# Patient Record
Sex: MALE | Race: WHITE | NOT HISPANIC OR LATINO | Employment: FULL TIME | URBAN - METROPOLITAN AREA
[De-identification: names, ages, dates, MRNs, and addresses within clinical notes are randomized per-mention and may not be internally consistent; named-entity substitution may affect disease eponyms.]

---

## 2017-03-27 ENCOUNTER — DOCUMENTATION ONLY (OUTPATIENT)
Dept: LAB | Facility: CLINIC | Age: 47
End: 2017-03-27

## 2017-03-27 DIAGNOSIS — Z00.00 ROUTINE GENERAL MEDICAL EXAMINATION AT A HEALTH CARE FACILITY: ICD-10-CM

## 2017-03-27 DIAGNOSIS — Z13.6 CARDIOVASCULAR SCREENING; LDL GOAL LESS THAN 160: Primary | ICD-10-CM

## 2017-03-27 DIAGNOSIS — Z12.5 SPECIAL SCREENING FOR MALIGNANT NEOPLASM OF PROSTATE: ICD-10-CM

## 2017-03-27 NOTE — PROGRESS NOTES
This patient has a lab only appointment on 3/28/2017 but does not have future orders. Please review, associate diagnosis and sign pending lab orders for the upcoming appointment.  He has an appointment with Dr. Steve on 3/28/2017 after the lab appointment.    Thank you,    [facility]

## 2017-03-28 ENCOUNTER — OFFICE VISIT (OUTPATIENT)
Dept: FAMILY MEDICINE | Facility: CLINIC | Age: 47
End: 2017-03-28
Payer: COMMERCIAL

## 2017-03-28 VITALS
OXYGEN SATURATION: 98 % | TEMPERATURE: 98.4 F | HEIGHT: 67 IN | SYSTOLIC BLOOD PRESSURE: 132 MMHG | WEIGHT: 166 LBS | BODY MASS INDEX: 26.06 KG/M2 | DIASTOLIC BLOOD PRESSURE: 83 MMHG | HEART RATE: 78 BPM

## 2017-03-28 DIAGNOSIS — Z13.6 CARDIOVASCULAR SCREENING; LDL GOAL LESS THAN 160: ICD-10-CM

## 2017-03-28 DIAGNOSIS — E78.1 HIGH TRIGLYCERIDES: ICD-10-CM

## 2017-03-28 DIAGNOSIS — Z00.00 ROUTINE GENERAL MEDICAL EXAMINATION AT A HEALTH CARE FACILITY: Primary | ICD-10-CM

## 2017-03-28 DIAGNOSIS — F41.0 PANIC ATTACKS: ICD-10-CM

## 2017-03-28 DIAGNOSIS — Z00.00 ROUTINE GENERAL MEDICAL EXAMINATION AT A HEALTH CARE FACILITY: ICD-10-CM

## 2017-03-28 DIAGNOSIS — Z12.5 SPECIAL SCREENING FOR MALIGNANT NEOPLASM OF PROSTATE: ICD-10-CM

## 2017-03-28 DIAGNOSIS — N28.1 RENAL CYST, RIGHT: ICD-10-CM

## 2017-03-28 LAB
ALBUMIN SERPL-MCNC: 4.1 G/DL (ref 3.4–5)
ALP SERPL-CCNC: 64 U/L (ref 40–150)
ALT SERPL W P-5'-P-CCNC: 42 U/L (ref 0–70)
ANION GAP SERPL CALCULATED.3IONS-SCNC: 9 MMOL/L (ref 3–14)
AST SERPL W P-5'-P-CCNC: 21 U/L (ref 0–45)
BILIRUB SERPL-MCNC: 0.7 MG/DL (ref 0.2–1.3)
BUN SERPL-MCNC: 15 MG/DL (ref 7–30)
CALCIUM SERPL-MCNC: 9.1 MG/DL (ref 8.5–10.1)
CHLORIDE SERPL-SCNC: 101 MMOL/L (ref 94–109)
CHOLEST SERPL-MCNC: 224 MG/DL
CO2 SERPL-SCNC: 29 MMOL/L (ref 20–32)
CREAT SERPL-MCNC: 1.1 MG/DL (ref 0.66–1.25)
ERYTHROCYTE [DISTWIDTH] IN BLOOD BY AUTOMATED COUNT: 12.7 % (ref 10–15)
GFR SERPL CREATININE-BSD FRML MDRD: 72 ML/MIN/1.7M2
GLUCOSE SERPL-MCNC: 96 MG/DL (ref 70–99)
HCT VFR BLD AUTO: 45.7 % (ref 40–53)
HDLC SERPL-MCNC: 55 MG/DL
HGB BLD-MCNC: 15.9 G/DL (ref 13.3–17.7)
LDLC SERPL CALC-MCNC: 98 MG/DL
MCH RBC QN AUTO: 30.5 PG (ref 26.5–33)
MCHC RBC AUTO-ENTMCNC: 34.8 G/DL (ref 31.5–36.5)
MCV RBC AUTO: 88 FL (ref 78–100)
NONHDLC SERPL-MCNC: 169 MG/DL
PLATELET # BLD AUTO: 209 10E9/L (ref 150–450)
POTASSIUM SERPL-SCNC: 4.2 MMOL/L (ref 3.4–5.3)
PROT SERPL-MCNC: 7.5 G/DL (ref 6.8–8.8)
PSA SERPL-ACNC: 0.82 UG/L (ref 0–4)
RBC # BLD AUTO: 5.22 10E12/L (ref 4.4–5.9)
SODIUM SERPL-SCNC: 139 MMOL/L (ref 133–144)
TRIGL SERPL-MCNC: 353 MG/DL
WBC # BLD AUTO: 7.1 10E9/L (ref 4–11)

## 2017-03-28 PROCEDURE — 36415 COLL VENOUS BLD VENIPUNCTURE: CPT | Performed by: FAMILY MEDICINE

## 2017-03-28 PROCEDURE — 80053 COMPREHEN METABOLIC PANEL: CPT | Performed by: FAMILY MEDICINE

## 2017-03-28 PROCEDURE — 99396 PREV VISIT EST AGE 40-64: CPT | Performed by: FAMILY MEDICINE

## 2017-03-28 PROCEDURE — 85027 COMPLETE CBC AUTOMATED: CPT | Performed by: FAMILY MEDICINE

## 2017-03-28 PROCEDURE — 80061 LIPID PANEL: CPT | Performed by: FAMILY MEDICINE

## 2017-03-28 PROCEDURE — G0103 PSA SCREENING: HCPCS | Performed by: FAMILY MEDICINE

## 2017-03-28 ASSESSMENT — ANXIETY QUESTIONNAIRES
6. BECOMING EASILY ANNOYED OR IRRITABLE: SEVERAL DAYS
3. WORRYING TOO MUCH ABOUT DIFFERENT THINGS: NOT AT ALL
7. FEELING AFRAID AS IF SOMETHING AWFUL MIGHT HAPPEN: NOT AT ALL
GAD7 TOTAL SCORE: 1
1. FEELING NERVOUS, ANXIOUS, OR ON EDGE: NOT AT ALL
5. BEING SO RESTLESS THAT IT IS HARD TO SIT STILL: NOT AT ALL
2. NOT BEING ABLE TO STOP OR CONTROL WORRYING: NOT AT ALL

## 2017-03-28 ASSESSMENT — PATIENT HEALTH QUESTIONNAIRE - PHQ9: 5. POOR APPETITE OR OVEREATING: NOT AT ALL

## 2017-03-28 NOTE — PROGRESS NOTES
SUBJECTIVE:     CC: Yunior Goss is an 46 year old male who presents for preventative health visit.     Dad with MI at 51yo. X-smoker dad and htn.  Mom with CVA - 64yo. Quit smoking 3 months.  Exercise - needs more.   No vitaminD but drinks 3 cups/day.   Healthy Habits:    Do you get at least three servings of calcium containing foods daily (dairy, green leafy vegetables, etc.)? yes    Amount of exercise or daily activities, outside of work: 0 day(s) per week    Problems taking medications regularly No    Medication side effects: No    Have you had an eye exam in the past two years? yes    Do you see a dentist twice per year? no    Do you have sleep apnea, excessive snoring or / he wants to talk about sleep apnea         PROBLEMS TO ADD ON...    Today's PHQ-2 Score:   PHQ-2 ( 1999 [employer]) 3/27/2017 6/5/2015   Q1: Little interest or pleasure in doing things - 0   Q2: Feeling down, depressed or hopeless - 0   PHQ-2 Score - 0   Little interest or pleasure in doing things Not at all -   Feeling down, depressed or hopeless Not at all -   PHQ-2 Score 0 -       Abuse: Current or Past(Physical, Sexual or Emotional)- No  Do you feel safe in your environment - Yes    Social History   Substance Use Topics     Smoking status: Current Every Day Smoker     Packs/day: 0.50     Years: 8.00     Types: Cigarettes     Smokeless tobacco: Never Used      Comment: 2013 1/2 ppd      Alcohol use Yes      Comment: daily 1-2 beers per day     beer - 2-3 a night     Last PSA: No results found for: PSA    Recent Labs   Lab Test  05/28/13   1338   CHOL  222*   HDL  44   LDL  126   TRIG  260*   CHOLHDLRATIO  5.1*       Reviewed orders with patient. Reviewed health maintenance and updated orders accordingly - Yes    Reviewed and updated as needed this visit by clinical staff         Reviewed and updated as needed this visit by Provider            ROS:  C: NEGATIVE for fever, chills, change in weight  I: NEGATIVE for worrisome rashes,  "moles or lesions. No changes. No major burning in sun  E: NEGATIVE for vision changes or irritation, ok exam recently.   ENT: NEGATIVE for ear, mouth and throat problems. Some snoring - no issues with darrell noted.   R: NEGATIVE for significant cough or SOB  CV: NEGATIVE for chest pain, palpitations or peripheral edema, good exercise tolerance  GI: NEGATIVE for nausea, abdominal pain, heartburn - on/off in past - egd in past, or change in bowel habits. No black or bloody stools.   male: negative for dysuria, hematuria, decreased urinary stream, erectile dysfunction, urethral discharge. No std concerns.   M: NEGATIVE for significant arthralgias or myalgia  N: NEGATIVE for weakness, dizziness or paresthesias  E: NEGATIVE for temperature intolerance, skin/hair changes  H: NEGATIVE for bleeding problems  P: NEGATIVE for changes in mood or affect. Rare panic attacks in past. Can happen sleeping or rarely driving. No interested in therapist or medications.     Problem list, Medication list, Allergies, and Medical/Social/Surgical histories reviewed in [facility] and updated as appropriate.  OBJECTIVE:     There were no vitals taken for this visit.   /83  Pulse 78  Temp 98.4  F (36.9  C) (Oral)  Ht 5' 6.5\" (1.689 m)  Wt 166 lb (75.3 kg)  SpO2 98%  BMI 26.39 kg/m2    EXAM:  GENERAL: healthy, alert and no distress  EYES: Eyes grossly normal to inspection, PERRL and conjunctivae and sclerae normal  HENT: ear canals and TM's normal, nose and mouth without ulcers or lesions  NECK: no adenopathy, no asymmetry, masses, or scars and thyroid normal to palpation  RESP: lungs clear to auscultation - no rales, rhonchi or wheezes  BREAST: normal without masses, tenderness or nipple discharge and no palpable axillary masses or adenopathy  CV: regular rate and rhythm, normal S1 S2, no S3 or S4, no murmur, click or rub, no peripheral edema and peripheral pulses strong  ABDOMEN: soft, nontender, no hepatosplenomegaly, no masses and " bowel sounds normal   (male): normal male genitalia without lesions or urethral discharge, no hernia  RECTAL (male): normal sphincter tone, no rectal masses, prostate normal size, smooth, nontender without nodules or masses  RECTAL (male): small ext hemorrhoids  MS: no gross musculoskeletal defects noted, no edema  SKIN: no suspicious lesions or rashes  NEURO: Normal strength and tone, mentation intact and speech normal  BACK: no CVA tenderness, no paralumbar tenderness  PSYCH: mentation appears normal, affect normal/bright  PSYCH: mildly anxious  LYMPH: no cervical, supraclavicular, axillary, or inguinal adenopathy    ASSESSMENT/PLAN:     ASSESSMENT / PLAN:  (Z00.00) Routine general medical examination at a health care facility  (primary encounter diagnosis)  Comment: generally healthy and normal labs/exam  Plan: Reviewed self mole/testicle check handout.  Call/email with questions/concerns. Hemorrhoid ext -mor water/ less ALCOHOL/caffeine and stooling time. Follow-up surgery if worse/not improving in next month. Expected course and warning signs reviewed. Call/email with questions/concerns    (E78.1) High triglycerides  Plan: exercise and limit ALCOHOL. Fish oil. Recheck one year. Consider statin if a lot worse. Monitor blood pressure too.     (F41.0) Panic attacks  Comment: infrequnet  Plan: consider beta-blocker or therapist. If SUICIAL IDEATION OR HOMOCIDAL IDEATION OR ANN TO ER. Limit caffeine/ ALCOHOL. Encouraged but smoking cessation.     (N28.1) Renal cyst, right  Comment: no symptoms. Patient was told to get one more follow-up u/s.   Plan: US Renal Limited        Back to urology if symptoms or a lot worse. Expected course and warning signs reviewed.         Patient interested in vasectomy - # given  to discuss    COUNSELING:  Reviewed preventive health counseling, as reflected in patient instructions       Regular exercise       Healthy diet/nutrition       Vision screening       Family  "planning       Prostate cancer screening       Osteoporosis Prevention/Bone Health         reports that he has been smoking Cigarettes.  He has a 4.00 pack-year smoking history. He has never used smokeless tobacco.    Estimated body mass index is 26 kg/(m^2) as calculated from the following:    Height as of 8/11/15: 5' 6\" (1.676 m).    Weight as of 9/2/15: 161 lb 1.6 oz (73.1 kg).       Counseling Resources:  ATP IV Guidelines  Pooled Cohorts Equation Calculator  FRAX Risk Assessment  ICSI Preventive Guidelines  Dietary Guidelines for Americans, 2010  Vatler's MyPlate  ASA Prophylaxis  Lung CA Screening    Alan Steve MD  [facility] [place]  Answers for HPI/ROS submitted by the patient on 3/27/2017   Annual Exam:  Getting at least 3 servings of Calcium per day:: Yes  Bi-annual eye exam:: Yes  Dental care twice a year:: NO  Sleep apnea or symptoms of sleep apnea:: None  Diet:: Regular (no restrictions)  Frequency of exercise:: None  Taking medications regularly:: Yes  Medication side effects:: Not applicable  Additional concerns today:: YES  Q1: Little interest or pleasure in doing things: 0=Not at all  Q2: Feeling down, depressed or hopeless: 0=Not at all  PHQ-2 Score: 0    "

## 2017-03-28 NOTE — MR AVS SNAPSHOT
After Visit Summary   3/28/2017    Yunior Goss    MRN: [id number]           Patient Information     Date Of Birth          1970        Visit Information        Provider Department      3/28/2017 3:10 PM Alan Steve MD [facility]        Today's Diagnoses     Routine general medical examination at a health care facility    -  1    High triglycerides        Panic attacks        Renal cyst, right          Care Instructions      Preventive Health Recommendations  Male Ages 40 to 49    Yearly exam:             See your health care provider every year in order to  o   Review health changes.   o   Discuss preventive care.    o   Review your medicines if your doctor has prescribed any.    You should be tested each year for STDs (sexually transmitted diseases) if you re at risk.     Have a cholesterol test every 5 years.     Have a colonoscopy (test for colon cancer) if someone in your family has had colon cancer or polyps before age 50.     After age 45, have a diabetes test (fasting glucose). If you are at risk for diabetes, you should have this test every 3 years.      Talk with your health care provider about whether or not a prostate cancer screening test (PSA) is right for you.    Shots: Get a flu shot each year. Get a tetanus shot every 10 years.     Nutrition:    Eat at least 5 servings of fruits and vegetables daily.     Eat whole-grain bread, whole-wheat pasta and brown rice instead of white grains and rice.     Talk to your provider about Calcium and Vitamin D.     Lifestyle    Exercise for at least 150 minutes a week (30 minutes a day, 5 days a week). This will help you control your weight and prevent disease.     Limit alcohol to one drink per day.     No smoking.     Wear sunscreen to prevent skin cancer.     See your dentist every six months for an exam and cleaning.            Follow-ups after your visit        Future tests that were ordered for you today     Open  "Future Orders        Priority Expected Expires Ordered    US Renal Limited Routine 6/26/2017 3/28/2018 3/28/2017            Who to contact     If you have questions or need follow up information about today's clinic visit or your schedule please contact [facility] directly at [phone].  Normal or non-critical lab and imaging results will be communicated to you by MyChart, letter or phone within 4 business days after the clinic has received the results. If you do not hear from us within 7 days, please contact the clinic through AeroDynEnergyhart or phone. If you have a critical or abnormal lab result, we will notify you by phone as soon as possible.  Submit refill requests through AppTap or call your pharmacy and they will forward the refill request to us. Please allow 3 business days for your refill to be completed.          Additional Information About Your Visit        MyChart Information     AppTap gives you secure access to your electronic health record. If you see a primary care provider, you can also send messages to your care team and make appointments. If you have questions, please call your primary care clinic.  If you do not have a primary care provider, please call [phone] and they will assist you.        Care EveryWhere ID     This is your Care EveryWhere ID. This could be used by other organizations to access your [place] medical records  [id number]        Your Vitals Were     Pulse Temperature Height Pulse Oximetry BMI (Body Mass Index)       78 98.4  F (36.9  C) (Oral) 5' 6.5\" (1.689 m) 98% 26.39 kg/m2        Blood Pressure from Last 3 Encounters:   03/28/17 132/83   09/02/15 134/86   08/11/15 129/90    Weight from Last 3 Encounters:   03/28/17 166 lb (75.3 kg)   09/02/15 161 lb 1.6 oz (73.1 kg)   08/11/15 163 lb 3.2 oz (74 kg)               Primary Care Provider Office Phone # Fax #    Alan Steve [phone]       [facility] [address] [address] " [facility] [address]        Thank you!     Thank you for choosing [facility]  for your care. Our goal is always to provide you with excellent care. Hearing back from our patients is one way we can continue to improve our services. Please take a few minutes to complete the written survey that you may receive in the mail after your visit with us. Thank you!             Your Updated Medication List - Protect others around you: Learn how to safely use, store and throw away your medicines at www.disposemymeds.org.          This list is accurate as of: 3/28/17  4:03 PM.  Always use your most recent med list.                   Brand Name Dispense Instructions for use    omeprazole 40 MG capsule    priLOSEC    30 capsule    Take 1 capsule (40 mg) by mouth daily Take 30-60 minutes before a meal.       TUMS 500 MG chewable tablet   Generic drug:  calcium carbonate      Take 1 chew tab by mouth daily.

## 2017-03-28 NOTE — NURSING NOTE
"Chief Complaint   Patient presents with     Physical       Initial /83  Pulse 78  Temp 98.4  F (36.9  C) (Oral)  Ht 5' 6.5\" (1.689 m)  Wt 166 lb (75.3 kg)  SpO2 98%  BMI 26.39 kg/m2 Estimated body mass index is 26.39 kg/(m^2) as calculated from the following:    Height as of this encounter: 5' 6.5\" (1.689 m).    Weight as of this encounter: 166 lb (75.3 kg).  Medication Reconciliation: complete   Paola Ocasio CMA    "

## 2017-03-29 ASSESSMENT — ANXIETY QUESTIONNAIRES: GAD7 TOTAL SCORE: 1

## 2017-03-29 ASSESSMENT — PATIENT HEALTH QUESTIONNAIRE - PHQ9: SUM OF ALL RESPONSES TO PHQ QUESTIONS 1-9: 0

## 2017-11-26 ENCOUNTER — TRANSFERRED RECORDS (OUTPATIENT)
Dept: HEALTH INFORMATION MANAGEMENT | Facility: CLINIC | Age: 47
End: 2017-11-26

## 2017-11-28 NOTE — PROGRESS NOTES
SUBJECTIVE:                                                    Yunior Goss is a 47 year old male who presents to clinic today for the following health issues:      ED/UC Followup:    Facility:  [facility]  Date of visit: 11/26/2017  Reason for visit: Per pt passed out and fell  Current Status: SX per pt feeling okay today       Patient was seen in the ER after passing out in his home and the sustaining a laceration to his upper lip. This was repaired with both internal sutures and 3 external sutures that need to be removed today. He feels the wound is healing well. No fevers or drainage from the area. It is only mildly tender. No bleeding.    He has a history of vasovagal syncope which is described very well in his ER history of present illness.  Before this incident patient had not eaten dinner and had 5 beers and admits to not drinking much water that day. His blood pressure was initially low in the ER but returned to normal. He has no history or evidence of seizures. He does have a history of infrequent panic attacks that only occur when he is driving, patient declines the need for help with his anxiety symptoms at this time. No depression. Denies suicidal or homicidal thoughts.  Patient instructed to go to the emergency room or call 911 if these occur.  He states he has about 2-3 beers per night, no other drug or alcohol use. CAGE negative. We discussed that this is above the recommended amount for men, he feels that it will not be difficult to decrease the amount to 10 or less per week. He states he normally does not skip meals. He had a normal EKG and lab workup in the ER. He was sent home with a diagnosis of vasovagal syncopal. It was again encouraged that he stay well hydrated throughout the day and decrease or eliminate his alcohol use. He reports no dizziness, headache, vision changes, or anxiety today.    Vitals today are stable.  Daily smoker.       Problem list and histories reviewed &  adjusted, as indicated.  Additional history: as documented    Patient Active Problem List   Diagnosis     CARDIOVASCULAR SCREENING; LDL GOAL LESS THAN 160     Overweight (BMI 25.0-29.9)     Tobacco abuse     Past Surgical History:   Procedure Laterality Date     BIOPSY  2000    Mole removed from back of leg.  Determined to be benign.     ESOPHAGOSCOPY, GASTROSCOPY, DUODENOSCOPY (EGD), COMBINED N/A 7/3/2015    Procedure: COMBINED ESOPHAGOSCOPY, GASTROSCOPY, DUODENOSCOPY (EGD);  Surgeon: Duane, William Charles, MD;  Location:  OR       Social History   Substance Use Topics     Smoking status: Current Every Day Smoker     Packs/day: 0.50     Years: 8.00     Types: Other     Start date: 8/1/2008     Last attempt to quit: 1/3/2017     Smokeless tobacco: Never Used      Comment: Quit tobacco cigarettes 1/3/2017. Continue to smoke e-cigarettes.     Alcohol use Yes      Comment: daily 1-2 beers per day     Family History   Problem Relation Age of Onset     DIABETES Mother 65     CEREBROVASCULAR DISEASE Mother 65     Myocardial Infarction Father 50     heart attack     Myocardial Infarction Maternal Grandfather 50         Current Outpatient Prescriptions   Medication Sig Dispense Refill     amoxicillin-clavulanate (AUGMENTIN) 875-125 MG per tablet Take 1 tablet by mouth 2 times daily       calcium carbonate (TUMS) 500 MG chewable tablet Take 1 chew tab by mouth daily.       omeprazole (PRILOSEC) 40 MG capsule Take 1 capsule (40 mg) by mouth daily Take 30-60 minutes before a meal. (Patient not taking: Reported on 11/30/2017) 30 capsule 0     No Known Allergies      ROS:  Constitutional, HEENT, cardiovascular, pulmonary, GI, , musculoskeletal, neuro, skin, endocrine and psych systems are negative, except as otherwise noted.      OBJECTIVE:   /86  Pulse 71  Temp 98.9  F (37.2  C) (Oral)  Wt 180 lb (81.6 kg)  SpO2 100%  BMI 28.62 kg/m2  Body mass index is 28.62 kg/(m^2).  GENERAL: healthy, alert and no  distress  RESP: lungs clear to auscultation - no rales, rhonchi or wheezes  CV: regular rate and rhythm, normal S1 S2, no S3 or S4, no murmur, click or rub, no peripheral edema and peripheral pulses strong  MS: no gross musculoskeletal defects noted, no edema  NEURO: Normal strength and tone, mentation intact and speech normal  PSYCH: mentation appears normal, affect normal/bright  Skin: wound well approximated and closed. No signs of infection/edema or erythema or drainge.     Procedure: Sterile suture kit was used to remove 3 sutures in patient's upper lip region. Patient tolerated well with minimal bleeding.    ASSESSMENT/PLAN:     ASSESSMENT / PLAN:  (R55) Syncope, unspecified syncope type  (primary encounter diagnosis)  Comment: Agree with the ER physician that his history is consistent with vasovagal syncope. However patient possibly would like to get an opinion from a neurologist and therefore referral was placed. He will follow-up if the symptoms occur again or change.  Plan: NEUROLOGY ADULT REFERRAL            (Z48.02) Visit for suture removal  Comment:   Plan: Keep area clean, follow-up only if needed    Chart documentation performed partially with Dragon Voice recognition Software. Although reviewed after completion, some word and grammatical error may remain.    Billin min spent face-to-face with patient. 15 min on history, 5 on exam and removing sutures, 5 on discussing diagnosis and treatment plan.           Amanda Hernandez PA-C  [facility]

## 2017-11-30 ENCOUNTER — OFFICE VISIT (OUTPATIENT)
Dept: FAMILY MEDICINE | Facility: CLINIC | Age: 47
End: 2017-11-30
Payer: COMMERCIAL

## 2017-11-30 ENCOUNTER — TELEPHONE (OUTPATIENT)
Dept: FAMILY MEDICINE | Facility: CLINIC | Age: 47
End: 2017-11-30

## 2017-11-30 VITALS
SYSTOLIC BLOOD PRESSURE: 137 MMHG | WEIGHT: 180 LBS | HEART RATE: 71 BPM | BODY MASS INDEX: 28.62 KG/M2 | TEMPERATURE: 98.9 F | OXYGEN SATURATION: 100 % | DIASTOLIC BLOOD PRESSURE: 86 MMHG

## 2017-11-30 DIAGNOSIS — R55 SYNCOPE, UNSPECIFIED SYNCOPE TYPE: Primary | ICD-10-CM

## 2017-11-30 DIAGNOSIS — Z48.02 VISIT FOR SUTURE REMOVAL: ICD-10-CM

## 2017-11-30 PROCEDURE — 99214 OFFICE O/P EST MOD 30 MIN: CPT | Performed by: PHYSICIAN ASSISTANT

## 2017-11-30 NOTE — MR AVS SNAPSHOT
After Visit Summary   11/30/2017    Yunior Goss    MRN: [id number]           Patient Information     Date Of Birth          1970        Visit Information        Provider Department      11/30/2017 3:00 PM Amanda Hernandez PA-C [facility]        Today's Diagnoses     Syncope, unspecified syncope type    -  1    Visit for suture removal           Follow-ups after your visit        Additional Services     NEUROLOGY ADULT REFERRAL       Your provider has referred you for the following:   Consult at [facility]: [facility] - [place]o Fallon [phone]   [email or web address]    Please be aware that coverage of these services is subject to the terms and limitations of your health insurance plan.  Call member services at your health plan with any benefit or coverage questions.      Please bring the following with you to your appointment:    (1) Any X-Rays, CTs or MRIs which have been performed.  Contact the facility where they were done to arrange for  prior to your scheduled appointment.    (2) List of current medications  (3) This referral request   (4) Any documents/labs given to you for this referral                  Who to contact     If you have questions or need follow up information about today's clinic visit or your schedule please contact [facility] directly at [phone].  Normal or non-critical lab and imaging results will be communicated to you by MyChart, letter or phone within 4 business days after the clinic has received the results. If you do not hear from us within 7 days, please contact the clinic through MyChart or phone. If you have a critical or abnormal lab result, we will notify you by phone as soon as possible.  Submit refill requests through VivaBioCell or call your pharmacy and they will forward the refill request to us. Please allow 3 business days for your refill to be completed.           Additional Information About Your Visit        MyChart Information     [employer] gives you secure access to your electronic health record. If you see a primary care provider, you can also send messages to your care team and make appointments. If you have questions, please call your primary care clinic.  If you do not have a primary care provider, please call [phone] and they will assist you.        Care EveryWhere ID     This is your Care EveryWhere ID. This could be used by other organizations to access your [place] medical records  [id number]        Your Vitals Were     Pulse Temperature Pulse Oximetry BMI (Body Mass Index)          71 98.9  F (37.2  C) (Oral) 100% 28.62 kg/m2         Blood Pressure from Last 3 Encounters:   11/30/17 137/86   03/28/17 132/83   09/02/15 134/86    Weight from Last 3 Encounters:   11/30/17 180 lb (81.6 kg)   03/28/17 166 lb (75.3 kg)   09/02/15 161 lb 1.6 oz (73.1 kg)              We Performed the Following     NEUROLOGY ADULT REFERRAL        Primary Care Provider Office Phone # Fax #    Alan Jeanmarie Steve [phone] [address] [facility] [address]        Equal Access to Services     ROSSY OLMEDO : Hadii dre ku hadasho Soomaali, waaxda luqadaha, qaybta kaalmada adeegyada, shay agudelo. So [facility] [phone].    ATENCIÓN: Si habla español, tiene a minor disposición servicios gratuitos de asistencia lingüística. Llame al [phone].    We comply with applicable federal civil rights laws and [place] laws. We do not discriminate on the basis of race, color, national origin, age, disability, sex, sexual orientation, or gender identity.            Thank you!     Thank you for choosing [facility]  for your care. Our goal is always to provide you with excellent care. Hearing back from our patients is one way we can continue to improve our services. Please take a few minutes to complete the written survey that you  may receive in the mail after your visit with us. Thank you!             Your Updated Medication List - Protect others around you: Learn how to safely use, store and throw away your medicines at www.disposemymeds.org.          This list is accurate as of: 11/30/17  3:36 PM.  Always use your most recent med list.                   Brand Name Dispense Instructions for use Diagnosis    amoxicillin-clavulanate 875-125 MG per tablet    AUGMENTIN     Take 1 tablet by mouth 2 times daily        omeprazole 40 MG capsule    priLOSEC    30 capsule    Take 1 capsule (40 mg) by mouth daily Take 30-60 minutes before a meal.    Abdominal pain, epigastric, Thoracic back pain, unspecified back pain laterality       TUMS 500 MG chewable tablet   Generic drug:  calcium carbonate      Take 1 chew tab by mouth daily.

## 2017-11-30 NOTE — TELEPHONE ENCOUNTER
Per My David's request. I called and left a message on the patient's voicemail to see what ER he was seen at so we can get records for Amanda to review before appointment.Leila Matias MA/EMANI

## 2017-11-30 NOTE — TELEPHONE ENCOUNTER
Patient called back, he was seen at [facility]. Do you want to see if you can pull anything for  Care everywhere? I can call [facility], but they may need an SUJIT.Leila Matias MA/EMANI

## 2017-11-30 NOTE — NURSING NOTE
"Chief Complaint   Patient presents with     Hospital F/U     [facility] ER F/U, pt passed out and has stitches on the lip that needs to be removed.        Initial /86  Pulse 71  Temp 98.9  F (37.2  C) (Oral)  Wt 180 lb (81.6 kg)  SpO2 100%  BMI 28.62 kg/m2 Estimated body mass index is 28.62 kg/(m^2) as calculated from the following:    Height as of 3/28/17: 5' 6.5\" (1.689 m).    Weight as of this encounter: 180 lb (81.6 kg).  Medication Reconciliation: complete      Surinder Cadena MA    "

## 2018-04-09 ENCOUNTER — OFFICE VISIT (OUTPATIENT)
Dept: FAMILY MEDICINE | Facility: CLINIC | Age: 48
End: 2018-04-09
Payer: COMMERCIAL

## 2018-04-09 VITALS
BODY MASS INDEX: 26.84 KG/M2 | HEIGHT: 67 IN | TEMPERATURE: 98.1 F | SYSTOLIC BLOOD PRESSURE: 121 MMHG | HEART RATE: 74 BPM | OXYGEN SATURATION: 99 % | RESPIRATION RATE: 18 BRPM | WEIGHT: 171 LBS | DIASTOLIC BLOOD PRESSURE: 80 MMHG

## 2018-04-09 DIAGNOSIS — J98.9 RESPIRATORY ILLNESS WITH FEVER: Primary | ICD-10-CM

## 2018-04-09 DIAGNOSIS — R50.9 RESPIRATORY ILLNESS WITH FEVER: Primary | ICD-10-CM

## 2018-04-09 DIAGNOSIS — J10.1 INFLUENZA B: ICD-10-CM

## 2018-04-09 DIAGNOSIS — R07.0 THROAT PAIN: ICD-10-CM

## 2018-04-09 LAB
DEPRECATED S PYO AG THROAT QL EIA: NORMAL
FLUAV+FLUBV AG SPEC QL: NEGATIVE
FLUAV+FLUBV AG SPEC QL: POSITIVE
SPECIMEN SOURCE: ABNORMAL
SPECIMEN SOURCE: NORMAL

## 2018-04-09 PROCEDURE — 87081 CULTURE SCREEN ONLY: CPT | Performed by: PHYSICIAN ASSISTANT

## 2018-04-09 PROCEDURE — 87804 INFLUENZA ASSAY W/OPTIC: CPT | Performed by: PHYSICIAN ASSISTANT

## 2018-04-09 PROCEDURE — 87880 STREP A ASSAY W/OPTIC: CPT | Performed by: PHYSICIAN ASSISTANT

## 2018-04-09 PROCEDURE — 99213 OFFICE O/P EST LOW 20 MIN: CPT | Performed by: PHYSICIAN ASSISTANT

## 2018-04-09 NOTE — MR AVS SNAPSHOT
"              After Visit Summary   4/9/2018    Yunior Goss    MRN: [id number]           Patient Information     Date Of Birth          1970        Visit Information        Provider Department      4/9/2018 4:20 PM Trae Lemon PA-C [facility]        Today's Diagnoses     Respiratory illness with fever    -  1    Throat pain        Fever        Influenza B           Follow-ups after your visit        Who to contact     Normal or non-critical lab and imaging results will be communicated to you by [employer], letter or phone within 4 business days after the clinic has received the results. If you do not hear from us within 7 days, please contact the clinic through Telemedicine [facility] or phone. If you have a critical or abnormal lab result, we will notify you by phone as soon as possible.  Submit refill requests through [employer] or call your pharmacy and they will forward the refill request to us. Please allow 3 business days for your refill to be completed.          If you need to speak with a  for additional information , please call: [phone]             Additional Information About Your Visit        [employer] Information     [employer] gives you secure access to your electronic health record. If you see a primary care provider, you can also send messages to your care team and make appointments. If you have questions, please call your primary care clinic.  If you do not have a primary care provider, please call [phone] and they will assist you.        Care EveryWhere ID     This is your Care EveryWhere ID. This could be used by other organizations to access your [place] medical records  [id number]        Your Vitals Were     Pulse Temperature Respirations Height Pulse Oximetry BMI (Body Mass Index)    74 98.1  F (36.7  C) (Tympanic) 18 5' 6.5\" (1.689 m) 99% 27.19 kg/m2       Blood Pressure from Last 3 Encounters:   04/09/18 121/80   11/30/17 137/86   03/28/17 132/83    Weight " from Last 3 Encounters:   04/09/18 171 lb (77.6 kg)   11/30/17 180 lb (81.6 kg)   03/28/17 166 lb (75.3 kg)              We Performed the Following     Beta strep group A culture     Influenza A/B antigen     Strep, Rapid Screen          Today's Medication Changes          These changes are accurate as of 4/9/18  5:22 PM.  If you have any questions, ask your nurse or doctor.               Stop taking these medicines if you haven't already. Please contact your care team if you have questions.     amoxicillin-clavulanate 875-125 MG per tablet   Commonly known as:  AUGMENTIN   Stopped by:  Trae Lemon PA-C           omeprazole 40 MG capsule   Commonly known as:  priLOSEC   Stopped by:  Trae Lemon PA-C                    Primary Care Provider Office Phone # Fax #    Alan Jeanmarie Steve [phone] [id number] [facility] [address]        Equal Access to Services     : Hadii dre ku hadasho Soomaali, waaxda luqadaha, qaybta kaalmada adeegyada, waxay idiin hayaan sienna daniel . So [facility] [phone].    ATENCIÓN: Si habla español, tiene a minor disposición servicios gratuitos de asistencia lingüística. Llame al [phone].    We comply with applicable federal civil rights laws and [place] laws. We do not discriminate on the basis of race, color, national origin, age, disability, sex, sexual orientation, or gender identity.            Thank you!     Thank you for choosing [facility]  for your care. Our goal is always to provide you with excellent care. Hearing back from our patients is one way we can continue to improve our services. Please take a few minutes to complete the written survey that you may receive in the mail after your visit with us. Thank you!             Your Updated Medication List - Protect others around you: Learn how to safely use, store and throw away your medicines at www.disposemymeds.org.          This list is accurate as of  4/9/18  5:22 PM.  Always use your most recent med list.                   Brand Name Dispense Instructions for use Diagnosis    TUMS 500 MG chewable tablet   Generic drug:  calcium carbonate      Take 1 chew tab by mouth daily.

## 2018-04-09 NOTE — PROGRESS NOTES
SUBJECTIVE:   Yunior Goss is a 47 year old male who presents to clinic today for the following health issues:      Acute Illness   Acute illness concerns: congestion and fatigue  Onset: 3 days    Fever: no    Chills/Sweats: no    Headache (location?): no    Sinus Pressure:YES    Conjunctivitis:  no    Ear Pain: no    Rhinorrhea: no    Congestion: YES    Sore Throat: YES     Cough: YES-non-productive    Wheeze: no    Decreased Appetite: no    Nausea: no    Vomiting: no    Diarrhea:  no    Dysuria/Freq.: no    Fatigue/Achiness: YES- fatigue    Sick/Strep Exposure: no     Therapies Tried and outcome: none          Problem list and histories reviewed & adjusted, as indicated.  Additional history: as documented    BP Readings from Last 3 Encounters:   04/09/18 121/80   11/30/17 137/86   03/28/17 132/83    Wt Readings from Last 3 Encounters:   04/09/18 171 lb (77.6 kg)   11/30/17 180 lb (81.6 kg)   03/28/17 166 lb (75.3 kg)                   Reviewed and updated as needed this visit by clinical staff  Tobacco  Allergies  Meds  Med Hx  Surg Hx  Fam Hx  Soc Hx      Reviewed and updated as needed this visit by Provider  Tobacco  Med Hx  Surg Hx  Fam Hx  Soc Hx        All other systems negative except as outline above  OBJECTIVE:  ENT exam reveals - bilaterally TM fluid noted, neck without nodes, pharynx erythematous without exudate, post nasal drip noted, nasal mucosa congested and nasal mucosa pale and congested.  CHEST:chest clear to IPPA, no tachypnea, retractions or cyanosis and S1, S2 normal, no murmur, no gallop, rate regular.    Yunior was seen today for nasal congestion.    Diagnoses and all orders for this visit:    Respiratory illness with fever    Throat pain  -     Strep, Rapid Screen  -     Beta strep group A culture    Influenza B    Other orders  -     Influenza A/B antigen      Advised supportive and symptomatic treatment.  Follow up with Provider - if condition persists or worsens.

## 2018-04-10 LAB
BACTERIA SPEC CULT: NORMAL
SPECIMEN SOURCE: NORMAL

## 2019-06-10 ENCOUNTER — OFFICE VISIT (OUTPATIENT)
Dept: FAMILY MEDICINE | Facility: CLINIC | Age: 49
End: 2019-06-10
Payer: COMMERCIAL

## 2019-06-10 ENCOUNTER — ANCILLARY PROCEDURE (OUTPATIENT)
Dept: GENERAL RADIOLOGY | Facility: CLINIC | Age: 49
End: 2019-06-10
Attending: FAMILY MEDICINE
Payer: COMMERCIAL

## 2019-06-10 VITALS
DIASTOLIC BLOOD PRESSURE: 90 MMHG | TEMPERATURE: 98.5 F | BODY MASS INDEX: 25.9 KG/M2 | HEART RATE: 72 BPM | SYSTOLIC BLOOD PRESSURE: 142 MMHG | WEIGHT: 165 LBS | HEIGHT: 67 IN | OXYGEN SATURATION: 100 % | RESPIRATION RATE: 16 BRPM

## 2019-06-10 DIAGNOSIS — R10.30 INGUINAL PAIN, UNSPECIFIED LATERALITY: ICD-10-CM

## 2019-06-10 DIAGNOSIS — N41.0 ACUTE PROSTATITIS: ICD-10-CM

## 2019-06-10 DIAGNOSIS — R10.30 INGUINAL PAIN, UNSPECIFIED LATERALITY: Primary | ICD-10-CM

## 2019-06-10 PROCEDURE — 99214 OFFICE O/P EST MOD 30 MIN: CPT | Performed by: FAMILY MEDICINE

## 2019-06-10 PROCEDURE — 74019 RADEX ABDOMEN 2 VIEWS: CPT

## 2019-06-10 RX ORDER — CIPROFLOXACIN 500 MG/1
500 TABLET, FILM COATED ORAL 2 TIMES DAILY
Qty: 14 TABLET | Refills: 0 | Status: SHIPPED | OUTPATIENT
Start: 2019-06-10 | End: 2019-06-17

## 2019-06-10 ASSESSMENT — PAIN SCALES - GENERAL: PAINLEVEL: MILD PAIN (2)

## 2019-06-10 ASSESSMENT — MIFFLIN-ST. JEOR: SCORE: 1569.13

## 2019-06-10 NOTE — NURSING NOTE
"Chief Complaint   Patient presents with     Prostate Problem       Initial /90   Pulse 72   Temp 98.5  F (36.9  C) (Oral)   Resp 16   Ht 1.689 m (5' 6.5\")   Wt 74.8 kg (165 lb)   SpO2 100%   BMI 26.23 kg/m   Estimated body mass index is 26.23 kg/m  as calculated from the following:    Height as of this encounter: 1.689 m (5' 6.5\").    Weight as of this encounter: 74.8 kg (165 lb).    Paola Ocasio, SHRUTHI      "

## 2019-06-10 NOTE — PROGRESS NOTES
"SUBJECTIVE:  Yunior Goss, a 48 year old male scheduled an appointment to discuss the following issues:  Concerns about prostate.   Some groin/bladder pressure x1 week. No fevers or chills.  History prostatitis x2 in past but not in awhile (10 years ago - cipro in past). No hematuria. No dysuria.   No rectal pain. Mild diarrhea. No black or bloody stools. No nausea, vomiting.  No std concerns or discharge.   No regular nsaids. No injury. No changes with position. No lumps noted. No family history no family history cancers.   Lots of water. Coffee 2-3 cups/day. ALCOHOL 2-3 beers/night. Office desk.   Medical, social, surgical, and family histories reviewed.    ROS:  All other ROS negative.     OBJECTIVE:  /90   Pulse 72   Temp 98.5  F (36.9  C) (Oral)   Resp 16   Ht 1.689 m (5' 6.5\")   Wt 74.8 kg (165 lb)   SpO2 100%   BMI 26.23 kg/m    EXAM:  GENERAL APPEARANCE: healthy, alert and no distress  EYES: EOMI,  PERRL  HENT: ear canals and TM's normal and nose and mouth without ulcers or lesions  NECK: no adenopathy, no asymmetry, masses, or scars and thyroid normal to palpation  RESP: lungs clear to auscultation - no rales, rhonchi or wheezes  CV: regular rates and rhythm, normal S1 S2, no S3 or S4 and no murmur, click or rub -  ABDOMEN:  soft, nontender, no HSM or masses and bowel sounds normal  Rectal exam: prostate symmetric w/o nodularity, mildly enlarged/mildly tender, no masses palpated  GU_male: testicles normal without atrophy or masses and no hernias  MS: extremities normal- no gross deformities noted, no evidence of inflammation in joints, FROM in all extremities.  SKIN: no suspicious lesions or rashes  NEURO: Normal strength and tone, sensory exam grossly normal, mentation intact and speech normal  PSYCH: mentation appears normal and affect normal/bright, mildly anxious    ASSESSMENT / PLAN:  (R10.30) Inguinal pain, unspecified laterality  (primary encounter diagnosis)  Comment: prostatitis " vs strain vs constipation vs ?  Plan: XR Abdomen 2 Views        See below. Ibuprofen over the counter 600mg 2-3x/day x 5-7 days. Return to clinic if not improving or new symptoms. Expected course and warning signs reviewed.   CPE with previsit labs in next 4-6 weeks. Call/email with questions/concerns.     (N41.0) Acute prostatitis  Comment: history in past similar  Plan: ciprofloxacin (CIPRO) 500 MG tablet        Reveiwed risks and side effects of medication  More water and limit caffeine/ ALCOHOL. To er if fevers/emesis/markedly worsening pain.  psa at cpe at follow-up. Urology if reoccurs.     Alan Steve MD

## 2019-06-17 ENCOUNTER — MYC MEDICAL ADVICE (OUTPATIENT)
Dept: FAMILY MEDICINE | Facility: CLINIC | Age: 49
End: 2019-06-17

## 2019-06-17 DIAGNOSIS — N41.0 ACUTE PROSTATITIS: ICD-10-CM

## 2019-06-17 RX ORDER — CIPROFLOXACIN 500 MG/1
500 TABLET, FILM COATED ORAL 2 TIMES DAILY
Qty: 14 TABLET | Refills: 0 | Status: SHIPPED | OUTPATIENT
Start: 2019-06-17 | End: 2019-07-23

## 2019-06-17 NOTE — TELEPHONE ENCOUNTER
Discussed with Kristen Kehr PA who advises 7 more days of Cipro. Prescription sent to pharmacy.  To provider to cosign.  Paola GARCIAN, RN

## 2019-06-17 NOTE — TELEPHONE ENCOUNTER
Ok for refill x4days more. Alan Steve MD. Will ask urology to see patient for second opinion if persists/reoccurs.

## 2019-07-23 ENCOUNTER — OFFICE VISIT (OUTPATIENT)
Dept: FAMILY MEDICINE | Facility: CLINIC | Age: 49
End: 2019-07-23
Payer: COMMERCIAL

## 2019-07-23 VITALS
DIASTOLIC BLOOD PRESSURE: 80 MMHG | WEIGHT: 165 LBS | SYSTOLIC BLOOD PRESSURE: 120 MMHG | HEIGHT: 67 IN | RESPIRATION RATE: 20 BRPM | BODY MASS INDEX: 25.9 KG/M2 | HEART RATE: 63 BPM | TEMPERATURE: 98.3 F | OXYGEN SATURATION: 100 %

## 2019-07-23 DIAGNOSIS — N41.0 ACUTE PROSTATITIS: ICD-10-CM

## 2019-07-23 PROCEDURE — 99213 OFFICE O/P EST LOW 20 MIN: CPT | Performed by: FAMILY MEDICINE

## 2019-07-23 RX ORDER — CIPROFLOXACIN 500 MG/1
500 TABLET, FILM COATED ORAL 2 TIMES DAILY
Qty: 28 TABLET | Refills: 0 | Status: SHIPPED | OUTPATIENT
Start: 2019-07-23 | End: 2019-07-23

## 2019-07-23 RX ORDER — CIPROFLOXACIN 500 MG/1
500 TABLET, FILM COATED ORAL 2 TIMES DAILY
Qty: 28 TABLET | Refills: 1 | Status: SHIPPED | OUTPATIENT
Start: 2019-07-23 | End: 2019-08-30

## 2019-07-23 ASSESSMENT — MIFFLIN-ST. JEOR: SCORE: 1569.13

## 2019-07-23 NOTE — NURSING NOTE
"Chief Complaint   Patient presents with     Prostate Problem       Initial /80   Pulse 63   Temp 98.3  F (36.8  C) (Oral)   Resp 20   Ht 1.689 m (5' 6.5\")   Wt 74.8 kg (165 lb)   SpO2 100%   BMI 26.23 kg/m   Estimated body mass index is 26.23 kg/m  as calculated from the following:    Height as of this encounter: 1.689 m (5' 6.5\").    Weight as of this encounter: 74.8 kg (165 lb).    Paola Ocasio, SHRUTHI    "

## 2019-07-23 NOTE — PROGRESS NOTES
"SUBJECTIVE:  Yunior Goss, a 48 year old male scheduled an appointment to discuss the following issues:  Prostate concerns. History prostatitis x3 in past and last was 6 weeks ago - placed on cipro. Advised to cut down on caffeine/ ALCOHOL.   Took 2 weeks to help resolve. Was ok x3 weeks and last week - restarted.   Needs to work on caffeine/ ALCOHOL. Water intake. No std concerns. No fevers or chills. No rectal pain.   Flow overall ok before symptoms started but now a little more frequent.   No side effects from cipro.   Medical, social, surgical, and family histories reviewed.    ROS:  All otherROS negative  OBJECTIVE:  /80   Pulse 63   Temp 98.3  F (36.8  C) (Oral)   Resp 20   Ht 1.689 m (5' 6.5\")   Wt 74.8 kg (165 lb)   SpO2 100%   BMI 26.23 kg/m    EXAM:  GENERAL APPEARANCE: healthy, alert and no distress  RESP: lungs clear to auscultation - no rales, rhonchi or wheezes  CV: regular rates and rhythm, normal S1 S2, no S3 or S4 and no murmur, click or rub -  ABDOMEN:  soft, nontender, no HSM or masses and bowel sounds normal  MS: extremities normal- no gross deformities noted, no evidence of inflammation in joints, FROM in all extremities.  PSYCH: mentation appears normal and affect normal/bright    ASSESSMENT / PLAN:  (N41.0) Acute prostatitis  Comment: reoccurred. No issues with cipro. Possible stone or mass or ?  Plan: ciprofloxacin (CIPRO) 500 MG tablet, UROLOGY         ADULT REFERRAL, DISCONTINUED: ciprofloxacin         (CIPRO) 500 MG tablet        More water and limit caffeine and ALCOHOL. Reveiwed risks and side effects of medication  Expected course and warning signs reviewed. Call/email with questions/concerns. To ER if high fevers/etc. Follow-up urology  For second opinion in next month. Expected course and warning signs reviewed.     Alan Steve MD            "

## 2019-08-30 ENCOUNTER — OFFICE VISIT (OUTPATIENT)
Dept: UROLOGY | Facility: CLINIC | Age: 49
End: 2019-08-30
Payer: COMMERCIAL

## 2019-08-30 VITALS — DIASTOLIC BLOOD PRESSURE: 84 MMHG | RESPIRATION RATE: 14 BRPM | SYSTOLIC BLOOD PRESSURE: 136 MMHG | HEART RATE: 94 BPM

## 2019-08-30 DIAGNOSIS — R10.30 INGUINAL PAIN, UNSPECIFIED LATERALITY: Primary | ICD-10-CM

## 2019-08-30 LAB
ALBUMIN UR-MCNC: NEGATIVE MG/DL
APPEARANCE UR: CLEAR
BILIRUB UR QL STRIP: NEGATIVE
COLOR UR AUTO: YELLOW
GLUCOSE UR STRIP-MCNC: NEGATIVE MG/DL
HGB UR QL STRIP: ABNORMAL
KETONES UR STRIP-MCNC: NEGATIVE MG/DL
LEUKOCYTE ESTERASE UR QL STRIP: NEGATIVE
NITRATE UR QL: NEGATIVE
PH UR STRIP: 5.5 PH (ref 5–7)
RBC #/AREA URNS AUTO: NORMAL /HPF
SOURCE: ABNORMAL
SP GR UR STRIP: 1.02 (ref 1–1.03)
UROBILINOGEN UR STRIP-ACNC: 0.2 EU/DL (ref 0.2–1)
WBC #/AREA URNS AUTO: NORMAL /HPF

## 2019-08-30 PROCEDURE — 99203 OFFICE O/P NEW LOW 30 MIN: CPT | Performed by: UROLOGY

## 2019-08-30 PROCEDURE — 81001 URINALYSIS AUTO W/SCOPE: CPT | Performed by: UROLOGY

## 2019-08-30 NOTE — PROGRESS NOTES
S: Patient is a pleasant 49-year-old  male who is request be seen by Dr. Alan Steve for a consultation with regard to patient's groin pain.   This started on June of this year when patient had a gradual worsening of his groin pain.  It locates bilaterally in the inguinal area behind the scrotum and over the suprapubic region.  He has no dysuria, frequency, urgency, hematuria.  He had similar problems many years ago and was diagnosed with prostatitis.  He received several courses of Cipro recently and that did not seem to solve the problem.  He is here for further evaluation.  Patient described pain as a dull achy pain without any obvious aggravating or relieving factors.  It comes and goes.  No current outpatient medications on file.     No Known Allergies  Past Medical History:   Diagnosis Date     GERD (gastroesophageal reflux disease)      Past Surgical History:   Procedure Laterality Date     BIOPSY  2000    Mole removed from back of leg.  Determined to be benign.     ESOPHAGOSCOPY, GASTROSCOPY, DUODENOSCOPY (EGD), COMBINED N/A 7/3/2015    Procedure: COMBINED ESOPHAGOSCOPY, GASTROSCOPY, DUODENOSCOPY (EGD);  Surgeon: Duane, William Charles, MD;  Location: [facility] OR      Family History   Problem Relation Age of Onset     Diabetes Mother 65     Cerebrovascular Disease Mother 65     Myocardial Infarction Father 50        heart attack     Myocardial Infarction Maternal Grandfather 50     Social History     Socioeconomic History     Marital status:      Spouse name: Jaycee     Number of children: 3     Years of education: None     Highest education level: None   Occupational History     Employer: [employer]   Social Needs     Financial resource strain: None     Food insecurity:     Worry: None     Inability: None     Transportation needs:     Medical: None     Non-medical: None   Tobacco Use     Smoking status: Former Smoker     Packs/day: 0.50     Years: 8.00     Pack years: 4.00     Start date:  2008     Last attempt to quit: 1/3/2017     Years since quittin.6     Smokeless tobacco: Never Used     Tobacco comment: e cig    Substance and Sexual Activity     Alcohol use: Yes     Comment: daily 1-2 beers per day     Drug use: No     Sexual activity: Yes     Partners: Female     Birth control/protection: Condom     Comment: Would like to discuss getting a vasectomy.   Lifestyle     Physical activity:     Days per week: None     Minutes per session: None     Stress: None   Relationships     Social connections:     Talks on phone: None     Gets together: None     Attends [employer] service: None     Active member of club or organization: None     Attends meetings of clubs or organizations: None     Relationship status: None     Intimate partner violence:     Fear of current or ex partner: None     Emotionally abused: None     Physically abused: None     Forced sexual activity: None   Other Topics Concern     Parent/sibling w/ CABG, MI or angioplasty before 65F 55M? Yes     Comment: Dad at age 52   Social History Narrative     None       REVIEW OF SYSTEMS  =================  C: NEGATIVE for fever, chills, change in weight  I: NEGATIVE for worrisome rashes, moles or lesions  E/M: NEGATIVE for ear, mouth and throat problems  R: NEGATIVE for significant cough or SHORTNESS OF BREATH  CV:  NEGATIVE for chest pain, palpitations or peripheral edema  GI: NEGATIVE for nausea, abdominal pain, heartburn, or change in bowel habits  NEURO: NEGATIVE numbness/weakness  : see HPI  PSYCH: NEGATIVE depression/anxiety  LYmph: no new enlarged lymph nodes  Ortho: no new trauma/movements      Physical Exam:  /84 (BP Location: Right arm, Patient Position: Chair, Cuff Size: Adult Regular)   Pulse 94   Resp 14    Patient is pleasant, in no acute distress, good general condition.  Heart:  negative, PMI normal  Lung: no evidence of respiratory distress    Abdomen: Soft, nondistended, non tender. No masses. No rebound or  guarding.   Exam: Penis no discharge.  Testes without any masses but no scrotal skin lesion.  Prostate 30 to 40 g smooth no nodules nontender.  Skin: Warm and dry.  No redness.  Neuro: grossly normal  Musculaskeletal: moving all extremities  Psych normal mood and affect  Musculoskeletal  moving all extremities  Hematologic/Lymphatic/Immunologic: normal ant/post cervical, axillary, supraclavicular and inguinal nodes      Urinalysis today normal     assessment/Plan:     Patient is a 49-year-old  male with pelvic-groin pain.  I do not see any evidence of prostatitis given lack of voiding symptoms.  I suspect that this is probably pelvic muscle sprain or strain.  I recommend observation.  Patient agrees.  If pain persists or gets worse in the near future he can contact me for a CT scan.  Follow-up with me as needed.

## 2019-09-10 ENCOUNTER — OFFICE VISIT (OUTPATIENT)
Dept: FAMILY MEDICINE | Facility: CLINIC | Age: 49
End: 2019-09-10
Payer: COMMERCIAL

## 2019-09-10 VITALS
HEIGHT: 67 IN | HEART RATE: 71 BPM | RESPIRATION RATE: 14 BRPM | SYSTOLIC BLOOD PRESSURE: 137 MMHG | BODY MASS INDEX: 25.74 KG/M2 | DIASTOLIC BLOOD PRESSURE: 85 MMHG | TEMPERATURE: 97.9 F | WEIGHT: 164 LBS | OXYGEN SATURATION: 100 %

## 2019-09-10 DIAGNOSIS — M77.11 LATERAL EPICONDYLITIS OF RIGHT ELBOW: Primary | ICD-10-CM

## 2019-09-10 DIAGNOSIS — S39.013A STRAIN OF MUSCLE OF PELVIS: ICD-10-CM

## 2019-09-10 PROCEDURE — 99213 OFFICE O/P EST LOW 20 MIN: CPT | Performed by: PHYSICIAN ASSISTANT

## 2019-09-10 ASSESSMENT — PAIN SCALES - GENERAL: PAINLEVEL: MILD PAIN (2)

## 2019-09-10 ASSESSMENT — MIFFLIN-ST. JEOR: SCORE: 1559.59

## 2019-09-10 NOTE — PROGRESS NOTES
SUBJECTIVE:   CC: Right Elbow Pain    HPI: Yunior Goss is a 49 year old male here today because of pain in his right elbow. 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 There was no specific injury associated with the onset, but it is intermittent and worse with lifting things. It has seemed to get better several times but begins to get worse once starting to use it during physical labor. He also has pelvic muscle strain diagnosed by urology - would like a physical therapy referral today. He originally saw Dr. Steve and was treated for what was thought to be a prostatitis, but it did not get better and was referred to urology. Urology ruled it out and diagnosed him with pelvic muscle strain. Dr. Phipps said to follow-up if it did not resolve, and wanted PT. No other concerns.       Onset: over 2 month    Description:   Character: stiff, painful     Intensity: varaible     Progression of Symptoms: worse    Accompanying Signs & Symptoms:  Other symptoms: unable to lift normally   History:   Previous similar pain: no       Precipitating factors:   Trauma or overuse: YES- possibly    Alleviating factors:  Improved by: rest/inactivity, advil, tylenol       Therapies Tried and outcome: see above    PAST MEDICAL HISTORY:   Past Medical History:   Diagnosis Date     GERD (gastroesophageal reflux disease)      PAST SURGICAL HISTORY:   Past Surgical History:   Procedure Laterality Date     BIOPSY  2000    Mole removed from back of leg.  Determined to be benign.     ESOPHAGOSCOPY, GASTROSCOPY, DUODENOSCOPY (EGD), COMBINED N/A 7/3/2015    Procedure: COMBINED ESOPHAGOSCOPY, GASTROSCOPY, DUODENOSCOPY (EGD);  Surgeon: Duane, William Charles, MD;  Location:  OR     FAMILY HISTORY:   Family History   Problem Relation Age of Onset     Diabetes Mother 65     Cerebrovascular Disease Mother 65     Myocardial Infarction Father 50        heart attack     Myocardial Infarction Maternal Grandfather 50  "    SOCIAL HISTORY:   Social History     Tobacco Use     Smoking status: Former Smoker     Packs/day: 0.50     Years: 8.00     Pack years: 4.00     Start date: 2008     Last attempt to quit: 1/3/2017     Years since quittin.6     Smokeless tobacco: Never Used     Tobacco comment: e cig    Substance Use Topics     Alcohol use: Yes     Comment: daily 1-2 beers per day       Review of Systems   ROS COMP:   CONSTITUTIONAL:NEGATIVE for fever, chills, change in weight  EYES: NEGATIVE for vision changes or irritation  ENT/MOUTH: NEGATIVE for ear, mouth and throat problems  RESP:NEGATIVE for significant cough or SOB  GI: NEGATIVE for nausea, abdominal pain, heartburn, or change in bowel habits  PSYCH: NEGATIVE for anxiety or depression    OBJECTIVE:  /85   Pulse 71   Temp 97.9  F (36.6  C) (Oral)   Resp 14   Ht 1.689 m (5' 6.5\")   Wt 74.4 kg (164 lb)   SpO2 100%   BMI 26.07 kg/m    General:  Yunior is awake, alert, and cooperative.  NAD.    Elbow Exam:   Inspection: no swelling  Tender: lateral epicondyle upon palpation   Range of Motion: all normal (no pain with movement)  Strength: elbow strength full  Special tests: normal stability, ulnar Tinel's negative:     Shoulder Exam:   Inspection: no swelling, bruising, discoloration, or obvious deformity or asymmetry  Tender: No  Range of Motion  Active:all normal  Passive: all normal  Strength: Shoulder strength full  Special tests:  Negative: Natasha Monge and O'Brvamsi    Wrist Exam: WRIST:  Inspection: no swelling  Palpation: Tender: None  Range of Motion: normal  Strength: Full strength of wrist  Pain with resisted wrist extension.     ASSESSMENT:     ICD-10-CM    1. Lateral epicondylitis of right elbow M77.11 [facility]   2. Strain of muscle of pelvis S39.013A [facility]     1. Ice or cold packs 20 minutes every 2-3 hrs as needed to relieve pain and swelling, for the first 2 days. Then can " apply heat 20 minutes every 2-3 hrs (avoid sleeping on heating pad) there after as needed. If you can tolerate, start non-steroidal anti-inflammatory medication like: Ibuprofen 600-800 mg three times daily or Aleve 2 tablets of over the counter strength twice a day as needed. Tylenol can help with pain also. Rest joint for a full 2 weeks. Follow-up with PT if pain/issue persists.     2. Patient asked for PT referral for strain of muscle of pelvis. See Dr. Phipps's urology note for more detail on how diagnosis was made. Referral made, but patient advised to follow-up with Dr. Phipps if PT does not resolve symptoms for further evaluation.      - ANNITA Carroll    The student acted as a scribe and the encounter documented above was completely performed by myself and the documentation reflects the work I have performed today.     Memo Heredia PA-C

## 2019-11-22 ENCOUNTER — OFFICE VISIT (OUTPATIENT)
Dept: FAMILY MEDICINE | Facility: CLINIC | Age: 49
End: 2019-11-22
Payer: COMMERCIAL

## 2019-11-22 VITALS
TEMPERATURE: 98.4 F | HEIGHT: 67 IN | SYSTOLIC BLOOD PRESSURE: 128 MMHG | HEART RATE: 78 BPM | BODY MASS INDEX: 26.21 KG/M2 | DIASTOLIC BLOOD PRESSURE: 82 MMHG | WEIGHT: 167 LBS | RESPIRATION RATE: 18 BRPM

## 2019-11-22 DIAGNOSIS — Z12.5 SCREENING PSA (PROSTATE SPECIFIC ANTIGEN): ICD-10-CM

## 2019-11-22 DIAGNOSIS — Z23 NEED FOR PROPHYLACTIC VACCINATION AND INOCULATION AGAINST INFLUENZA: ICD-10-CM

## 2019-11-22 DIAGNOSIS — Z12.11 SPECIAL SCREENING FOR MALIGNANT NEOPLASMS, COLON: ICD-10-CM

## 2019-11-22 DIAGNOSIS — Z00.00 ROUTINE HISTORY AND PHYSICAL EXAMINATION OF ADULT: Primary | ICD-10-CM

## 2019-11-22 DIAGNOSIS — E78.1 HIGH TRIGLYCERIDES: ICD-10-CM

## 2019-11-22 LAB
ALBUMIN SERPL-MCNC: 4.4 G/DL (ref 3.4–5)
ALBUMIN UR-MCNC: NEGATIVE MG/DL
ALP SERPL-CCNC: 55 U/L (ref 40–150)
ALT SERPL W P-5'-P-CCNC: 39 U/L (ref 0–70)
ANION GAP SERPL CALCULATED.3IONS-SCNC: 3 MMOL/L (ref 3–14)
APPEARANCE UR: CLEAR
AST SERPL W P-5'-P-CCNC: 25 U/L (ref 0–45)
BILIRUB SERPL-MCNC: 0.7 MG/DL (ref 0.2–1.3)
BILIRUB UR QL STRIP: NEGATIVE
BUN SERPL-MCNC: 11 MG/DL (ref 7–30)
CALCIUM SERPL-MCNC: 9.7 MG/DL (ref 8.5–10.1)
CHLORIDE SERPL-SCNC: 104 MMOL/L (ref 94–109)
CHOLEST SERPL-MCNC: 238 MG/DL
CO2 SERPL-SCNC: 30 MMOL/L (ref 20–32)
COLOR UR AUTO: YELLOW
CREAT SERPL-MCNC: 1.09 MG/DL (ref 0.66–1.25)
ERYTHROCYTE [DISTWIDTH] IN BLOOD BY AUTOMATED COUNT: 12.6 % (ref 10–15)
GFR SERPL CREATININE-BSD FRML MDRD: 79 ML/MIN/{1.73_M2}
GLUCOSE SERPL-MCNC: 94 MG/DL (ref 70–99)
GLUCOSE UR STRIP-MCNC: NEGATIVE MG/DL
HCT VFR BLD AUTO: 47.5 % (ref 40–53)
HDLC SERPL-MCNC: 65 MG/DL
HGB BLD-MCNC: 16.4 G/DL (ref 13.3–17.7)
HGB UR QL STRIP: NEGATIVE
KETONES UR STRIP-MCNC: NEGATIVE MG/DL
LDLC SERPL CALC-MCNC: 131 MG/DL
LEUKOCYTE ESTERASE UR QL STRIP: NEGATIVE
MCH RBC QN AUTO: 30.9 PG (ref 26.5–33)
MCHC RBC AUTO-ENTMCNC: 34.5 G/DL (ref 31.5–36.5)
MCV RBC AUTO: 90 FL (ref 78–100)
NITRATE UR QL: NEGATIVE
NONHDLC SERPL-MCNC: 173 MG/DL
PH UR STRIP: 6 PH (ref 5–7)
PLATELET # BLD AUTO: 224 10E9/L (ref 150–450)
POTASSIUM SERPL-SCNC: 4.4 MMOL/L (ref 3.4–5.3)
PROT SERPL-MCNC: 8 G/DL (ref 6.8–8.8)
PSA SERPL-ACNC: 0.83 UG/L (ref 0–4)
RBC # BLD AUTO: 5.31 10E12/L (ref 4.4–5.9)
SODIUM SERPL-SCNC: 137 MMOL/L (ref 133–144)
SOURCE: NORMAL
SP GR UR STRIP: 1.01 (ref 1–1.03)
TRIGL SERPL-MCNC: 209 MG/DL
UROBILINOGEN UR STRIP-ACNC: 0.2 EU/DL (ref 0.2–1)
WBC # BLD AUTO: 6.8 10E9/L (ref 4–11)

## 2019-11-22 PROCEDURE — 80053 COMPREHEN METABOLIC PANEL: CPT | Performed by: FAMILY MEDICINE

## 2019-11-22 PROCEDURE — 99396 PREV VISIT EST AGE 40-64: CPT | Mod: 25 | Performed by: FAMILY MEDICINE

## 2019-11-22 PROCEDURE — G0103 PSA SCREENING: HCPCS | Performed by: FAMILY MEDICINE

## 2019-11-22 PROCEDURE — 81003 URINALYSIS AUTO W/O SCOPE: CPT | Performed by: FAMILY MEDICINE

## 2019-11-22 PROCEDURE — 90686 IIV4 VACC NO PRSV 0.5 ML IM: CPT | Performed by: FAMILY MEDICINE

## 2019-11-22 PROCEDURE — 90471 IMMUNIZATION ADMIN: CPT | Performed by: FAMILY MEDICINE

## 2019-11-22 PROCEDURE — 85027 COMPLETE CBC AUTOMATED: CPT | Performed by: FAMILY MEDICINE

## 2019-11-22 PROCEDURE — 80061 LIPID PANEL: CPT | Performed by: FAMILY MEDICINE

## 2019-11-22 PROCEDURE — 36415 COLL VENOUS BLD VENIPUNCTURE: CPT | Performed by: FAMILY MEDICINE

## 2019-11-22 RX ORDER — CALCIUM CARBONATE 500 MG/1
1 TABLET, CHEWABLE ORAL 2 TIMES DAILY PRN
COMMUNITY

## 2019-11-22 RX ORDER — OMEGA-3 FATTY ACIDS/FISH OIL 300-1000MG
400 CAPSULE ORAL DAILY
COMMUNITY

## 2019-11-22 ASSESSMENT — ENCOUNTER SYMPTOMS
NERVOUS/ANXIOUS: 1
WEAKNESS: 0
FEVER: 0
PARESTHESIAS: 0
DIARRHEA: 0
CHILLS: 0
HEMATOCHEZIA: 1
NAUSEA: 0
ARTHRALGIAS: 1
HEMATURIA: 0
JOINT SWELLING: 0
ABDOMINAL PAIN: 1
HEARTBURN: 1
COUGH: 0
CONSTIPATION: 0
MYALGIAS: 0
PALPITATIONS: 0
EYE PAIN: 0
SORE THROAT: 0
DYSURIA: 0
HEADACHES: 0
DIZZINESS: 0
SHORTNESS OF BREATH: 0
FREQUENCY: 0

## 2019-11-22 ASSESSMENT — MIFFLIN-ST. JEOR: SCORE: 1573.2

## 2019-11-22 NOTE — NURSING NOTE
I gave him a vasectomy Book. He will schedule a consult  when ready with a provider  Paola Ocasio CMA

## 2019-11-22 NOTE — NURSING NOTE
Audiometric test:    Right Ear -                       500 Hz: 25                    1000 Hz: 20                    2000 Hz: 20                     4000 HZ: 20                    6000 HZ: 20                     8000 HZ: 35   Left Ear -                      500 Hz: 25                  1000 Hz: 20                  2000 Hz: 20                   4000 HZ: 20                  6000 HZ: 20                  8000 HZ: 40    Paola Ocasio CMA

## 2019-11-22 NOTE — PROGRESS NOTES
SUBJECTIVE:   CC: Yunior Goss is an 49 year old male who presents for preventative health visit.   Dad with MI at 51yo. X-smoker dad and htn. Patient with high triglycerides.  Mom with CVA - 66yo. Quit smoking.  Needs more exercise. No gym membership. No chest pain or shortness of breath.   Sibling doing ok. No family history cancer.   Hearing loss - stable through years.   Mild anxious - overall ok. Not interested in medication at this point. No therapist in past.   Caffeine 2-3 cups coffee/day. Rare pop. Water intake ok.   ALCOHOL 3-4 beers/night.   History hemorhoids. Reading toilet. No fiber supplement.   3 beers last night.   Healthy Habits:     Getting at least 3 servings of Calcium per day:  Yes    Bi-annual eye exam:  Yes    Dental care twice a year:  NO    Sleep apnea or symptoms of sleep apnea:  Daytime drowsiness    Diet:  Regular (no restrictions)    Frequency of exercise:  None    Taking medications regularly:  Yes    Medication side effects:  Not applicable    PHQ-2 Total Score: 0    Additional concerns today:  Yes          PROBLEMS TO ADD ON...    Today's PHQ-2 Score:   PHQ-2 (  [employer]) 2019   Q1: Little interest or pleasure in doing things 0   Q2: Feeling down, depressed or hopeless 0   PHQ-2 Score 0   Q1: Little interest or pleasure in doing things Not at all   Q2: Feeling down, depressed or hopeless Not at all   PHQ-2 Score 0       Abuse: Current or Past(Physical, Sexual or Emotional)- No  Do you feel safe in your environment? Yes        Social History     Tobacco Use     Smoking status: Former Smoker     Packs/day: 0.50     Years: 8.00     Pack years: 4.00     Start date: 2008     Last attempt to quit: 1/3/2017     Years since quittin.8     Smokeless tobacco: Never Used     Tobacco comment: e cig    Substance Use Topics     Alcohol use: Yes     Comment: daily 1-2 beers per day       Alcohol Use 2019   Prescreen: >3 drinks/day or >7 drinks/week? Yes   Prescreen: >3  "drinks/day or >7 drinks/week? -   AUDIT SCORE  9     Last PSA:   PSA   Date Value Ref Range Status   03/28/2017 0.82 0 - 4 ug/L Final     Comment:     Assay Method:  Chemiluminescence using Siemens Vista analyzer       Reviewed orders with patient. Reviewed health maintenance and updated orders accordingly - Yes  Lab work is in process    Reviewed and updated as needed this visit by clinical staff         Reviewed and updated as needed this visit by Provider            Review of Systems   Constitutional: Negative for chills and fever.   HENT: Positive for hearing loss. Negative for congestion, ear pain and sore throat.    Eyes: Negative for pain and visual disturbance.   Respiratory: Negative for cough and shortness of breath.    Cardiovascular: Negative for chest pain, palpitations and peripheral edema.   Gastrointestinal: Positive for abdominal pain, heartburn and hematochezia. Negative for constipation, diarrhea and nausea.   Genitourinary: Negative for discharge, dysuria, frequency, genital sores, hematuria, impotence and urgency.   Musculoskeletal: Positive for arthralgias. Negative for joint swelling and myalgias.   Skin: Negative for rash.   Neurological: Negative for dizziness, weakness, headaches and paresthesias.   Psychiatric/Behavioral: Negative for mood changes. The patient is nervous/anxious.      All other ROS negative.     OBJECTIVE:   There were no vitals taken for this visit.  /82   Pulse 78   Temp 98.4  F (36.9  C) (Oral)   Resp 18   Ht 1.689 m (5' 6.5\")   Wt 75.8 kg (167 lb)   BMI 26.55 kg/m      Physical Exam  GENERAL: healthy, alert and no distress  EYES: Eyes grossly normal to inspection, PERRL and conjunctivae and sclerae normal  HENT: ear canals and TM's normal, nose and mouth without ulcers or lesions  NECK: no adenopathy, no asymmetry, masses, or scars and thyroid normal to palpation  RESP: lungs clear to auscultation - no rales, rhonchi or wheezes  BREAST: normal without " masses, tenderness or nipple discharge and no palpable axillary masses or adenopathy  CV: regular rate and rhythm, normal S1 S2, no S3 or S4, no murmur, click or rub, no peripheral edema and peripheral pulses strong  ABDOMEN: soft, nontender, no hepatosplenomegaly, no masses and bowel sounds normal   (male): normal male genitalia without lesions or urethral discharge, no hernia  RECTAL (male): patient deferred full rectal exam smooth  Small external hemorrhoids.   MS: no gross musculoskeletal defects noted, no edema  SKIN: no suspicious lesions or rashes  NEURO: Normal strength and tone, mentation intact and speech normal  BACK: no CVA tenderness, no paralumbar tenderness  PSYCH: mentation appears normal, affect normal/bright  LYMPH: no cervical, supraclavicular, axillary, or inguinal adenopathy        ASSESSMENT/PLAN:   ASSESSMENT / PLAN:  (Z00.00) Routine history and physical examination of adult  (primary encounter diagnosis)  Comment: generally healthy and normal exam  Plan: CBC with platelets, Comprehensive metabolic         panel (BMP + Alb, Alk Phos, ALT, AST, Total.         Bili, TP), UA reflex to Microscopic        Await labs. Reviewed self mole/testicle check handout.  More exercise and limit toilet time. Add fiber supplement. colonscopy at 50 or sooner if worse stool issues/low hgb/+fit/etc. Expected course and warning signs reviewed.Call/email with questions/concerns   Hearing not bad - can see audiology if worse.     (Z12.5) Screening PSA (prostate specific antigen)  Plan: PSA, screen            (E78.1) High triglycerides  Comment: likely from lack of exercise and too much beer  Plan: Comprehensive metabolic panel (BMP + Alb, Alk         Phos, ALT, AST, Total. Bili, TP), Lipid Profile        (Chol, Trig, HDL, LDL calc)        Limit ALCOHOL and increase exercise. Blood pressure ok and non-smoker. Consider statin if worse. Chest pain or shortness of breath to er    (Z12.11) Special screening for  "malignant neoplasms, colon    Plan: Fecal colorectal cancer screen (FIT)            (Z23) Need for prophylactic vaccination and inoculation against influenza  Plan: INFLUENZA VACCINE IM > 6 MONTHS VALENT IIV4         [32550], Vaccine Administration, Initial         [76891]                COUNSELING:   Reviewed preventive health counseling, as reflected in patient instructions       Regular exercise       Healthy diet/nutrition       Vision screening       Hearing screening       Colon cancer screening       Prostate cancer screening       Osteoporosis Prevention/Bone Health    Estimated body mass index is 26.07 kg/m  as calculated from the following:    Height as of 9/10/19: 1.689 m (5' 6.5\").    Weight as of 9/10/19: 74.4 kg (164 lb).          reports that he quit smoking about 2 years ago. He started smoking about 11 years ago. He has a 4.00 pack-year smoking history. He has never used smokeless tobacco.      Counseling Resources:  ATP IV Guidelines  Pooled Cohorts Equation Calculator  FRAX Risk Assessment  ICSI Preventive Guidelines  Dietary Guidelines for Americans, 2010  USDA's MyPlate  ASA Prophylaxis  Lung CA Screening    Alan Steve MD  [facility]  "

## 2019-11-22 NOTE — NURSING NOTE
"Chief Complaint   Patient presents with     Physical       Initial /82   Pulse 78   Temp 98.4  F (36.9  C) (Oral)   Resp 18   Ht 1.689 m (5' 6.5\")   Wt 75.8 kg (167 lb)   BMI 26.55 kg/m   Estimated body mass index is 26.55 kg/m  as calculated from the following:    Height as of this encounter: 1.689 m (5' 6.5\").    Weight as of this encounter: 75.8 kg (167 lb).    Paola Ocasio CMA    "

## 2020-02-12 ENCOUNTER — OFFICE VISIT (OUTPATIENT)
Dept: FAMILY MEDICINE | Facility: CLINIC | Age: 50
End: 2020-02-12
Payer: COMMERCIAL

## 2020-02-12 VITALS
SYSTOLIC BLOOD PRESSURE: 120 MMHG | HEART RATE: 82 BPM | WEIGHT: 168 LBS | TEMPERATURE: 98.2 F | DIASTOLIC BLOOD PRESSURE: 85 MMHG | RESPIRATION RATE: 16 BRPM | OXYGEN SATURATION: 99 % | HEIGHT: 67 IN | BODY MASS INDEX: 26.37 KG/M2

## 2020-02-12 DIAGNOSIS — F41.0 PANIC ATTACK: Primary | ICD-10-CM

## 2020-02-12 PROCEDURE — 99213 OFFICE O/P EST LOW 20 MIN: CPT | Performed by: FAMILY MEDICINE

## 2020-02-12 ASSESSMENT — MIFFLIN-ST. JEOR: SCORE: 1577.73

## 2020-02-12 NOTE — PROGRESS NOTES
"SUBJECTIVE:  49 year old.The patient has a history of panic attacks. .  This has been going on for on and off for several years.  Inciting incidents that have caused this are driving.  Associated symptoms include flushing and increased blood pressre. Family history mom.  OBJECTIVE:  no apparent distress  /85   Pulse 82   Temp 98.2  F (36.8  C) (Oral)   Resp 16   Ht 1.689 m (5' 6.5\")   Wt 76.2 kg (168 lb)   SpO2 99%   BMI 26.71 kg/m     MENTAL STATUS EXAM  Appearance: appropriate  Attitude: cooperative  Behavior: normal  Eyre Contact: normal  Speech: normal  Orientation: oreinted to person , place, time and situation  Mood:  admits anxiety  Affect: Mood Congruient  Thought Process: clear  Suicidal Ideation: reports thoughts, no intention  Hallucination: no  The 10-year ASCVD risk score (Mary Ann MARIE Jr., et al., 2013) is: 2.7%    Values used to calculate the score:      Age: 49 years      Sex: Male      Is Non- : No      Diabetic: No      Tobacco smoker: No      Systolic Blood Pressure: 120 mmHg      Is BP treated: No      HDL Cholesterol: 65 mg/dL      Total Cholesterol: 238 mg/dL      ICD-10-CM    1. Panic attack F41.0     PLAN:decrease alcohol, exercise, watch diet  Consider Zoloft               "

## 2020-04-16 ENCOUNTER — NURSE TRIAGE (OUTPATIENT)
Dept: NURSING | Facility: CLINIC | Age: 50
End: 2020-04-16

## 2020-04-16 NOTE — TELEPHONE ENCOUNTER
Pt called in states he has skipped heart beat.  The symptom started a week ago.  Pt states he has sensation of skipped heart beat for long time ago.  yesterday it happened more frequent.  Pt pulse is 80/m  The symptom happened every 15 min yesterday  The symptom happened 4 time today.  Pt states he didn't know the cause of the symptom.  Pt states he vaping and drink beer.  He drink coffee.  No history of heart attach, no history of heart surgery.  No dizziness, no chest pain, no sweating, no difficulty breathing.  The disposition is to be seen with in the next 3 days.  Phone appointment is made for the Pt.  Care advice given per protocol.  Patient agrees with care advice given.   Agreed to call back if he has additional symptoms or questions.       Kenenth Kaur Nurse Advisor 4/16/2020 1:35 PM      Reason for Disposition    [1] Palpitations AND [2] no improvement after using CARE ADVICE    Additional Information    Negative: Passed out (i.e., lost consciousness, collapsed and was not responding)    Negative: Shock suspected (e.g., cold/pale/clammy skin, too weak to stand, low BP, rapid pulse)    Negative: Difficult to awaken or acting confused (e.g., disoriented, slurred speech)    Negative: Visible sweat on face or sweat dripping down face    Negative: Unable to walk, or can only walk with assistance (e.g., requires support)    Negative: [1] Received SHOCK from implantable cardiac defibrillator AND [2] persisting symptoms (i.e., palpitations, lightheadedness)    Negative: Sounds like a life-threatening emergency to the triager    Negative: Chest pain    Negative: Difficulty breathing    Negative: Dizziness, lightheadedness, or weakness    Negative: [1] Heart beating very rapidly (e.g., > 140 / minute) AND [2] present now  (Exception: during exercise)    Negative: Heart beating very slowly (e.g., < 50 / minute)  (Exception: athlete)    Negative: New or worsened shortness of breath with activity (dyspnea on  "exertion)    Negative: Patient sounds very sick or weak to the triager    Negative: [1] Heart beating very rapidly (e.g., > 140 / minute) AND [2] not present now  (Exception: during exercise)    Negative: [1] Skipped or extra beat(s) AND [2] increases with exercise or exertion    Negative: [1] Skipped or extra beat(s) AND [2] occurs 4 or more times per minute    Negative: New or worsened ankle swelling    Negative: History of heart disease  (i.e., heart attack, bypass surgery, angina, angioplasty, CHF) (Exception: brief heart beat symptoms that went away and now feels well)    Negative: Age > 60 years (Exception: brief heart beat symptoms that went away and now feels well)    Negative: Taking water pill (i.e., diuretic) or heart medication (e.g., digoxin)    Negative: Wearing a \"holter monitor\" or \"cardiac event monitor\"    Negative: [1] Received SHOCK from implantable cardiac defibrillator AND [2] now feels well    Negative: History of hyperthyroidism or taking thyroid medication    Negative: Known or suspected substance abuse (e.g., cocaine, alcohol abuse)    Protocols used: HEART RATE AND HEARTBEAT [id number]      "

## 2020-04-17 ENCOUNTER — VIRTUAL VISIT (OUTPATIENT)
Dept: FAMILY MEDICINE | Facility: CLINIC | Age: 50
End: 2020-04-17
Payer: COMMERCIAL

## 2020-04-17 DIAGNOSIS — F41.9 ANXIETY: Primary | ICD-10-CM

## 2020-04-17 DIAGNOSIS — R00.2 PALPITATIONS: ICD-10-CM

## 2020-04-17 DIAGNOSIS — Z82.49 FHX: CORONARY ARTERIOSCLEROSIS: ICD-10-CM

## 2020-04-17 PROCEDURE — 99214 OFFICE O/P EST MOD 30 MIN: CPT | Mod: TEL | Performed by: FAMILY MEDICINE

## 2020-04-17 RX ORDER — PROPRANOLOL HYDROCHLORIDE 20 MG/1
20 TABLET ORAL 2 TIMES DAILY PRN
Qty: 60 TABLET | Refills: 1 | Status: SHIPPED | OUTPATIENT
Start: 2020-04-17 | End: 2020-05-04

## 2020-04-17 NOTE — PROGRESS NOTES
"Yunior Goss is a 49 year old male who is being evaluated via a billable telephone visit.    Palpitations. Hx of panic attacks.   Feeling like \"skipped heart beats\" past week. Similar issues in past for years but infrequently. Can feel multiple times/day sporadically.  No chest pain. Brief sensations. Normal ekg in past. Can be a couple times/hour. Non-smoker, but some vaping normal blood pressure and lipids ok.   Dad with MI at 52year old. Patient no stress test in past.   2-3 cups coffee/day. No pop. 3 beers/day. No regular exercise recently but was doing some speed walking 2 months ago and good exercise tolerance.   Patient thinks might be related to anxiety too.   The patient has been notified of following:     \"This telephone visit will be conducted via a call between you and your physician/provider. We have found that certain health care needs can be provided without the need for a physical exam.  This service lets us provide the care you need with a short phone conversation.  If a prescription is necessary we can send it directly to your pharmacy.  If lab work is needed we can place an order for that and you can then stop by our lab to have the test done at a later time.    Telephone visits are billed at different rates depending on your insurance coverage. During this emergency period, for some insurers they may be billed the same as an in-person visit.  Please reach out to your insurance provider with any questions.    If during the course of the call the physician/provider feels a telephone visit is not appropriate, you will not be charged for this service.\"    Patient has given verbal consent for Telephone visit?  Yes    How would you like to obtain your AVS? Travis    Subjective     Yunior Goss is a 49 year old male who presents to clinic today for the following health issues:    Discuss heart palpitations per pt x 1 week, no known injury.    PROBLEMS TO ADD ON...    Patient Active " Problem List   Diagnosis     CARDIOVASCULAR SCREENING; LDL GOAL LESS THAN 160     Overweight (BMI 25.0-29.9)     Tobacco abuse     Syncope, unspecified syncope type     Strain of muscle of pelvis     Past Surgical History:   Procedure Laterality Date     BIOPSY  2000    Mole removed from back of leg.  Determined to be benign.     ESOPHAGOSCOPY, GASTROSCOPY, DUODENOSCOPY (EGD), COMBINED N/A 7/3/2015    Procedure: COMBINED ESOPHAGOSCOPY, GASTROSCOPY, DUODENOSCOPY (EGD);  Surgeon: Duane, William Charles, MD;  Location:  OR       Social History     Tobacco Use     Smoking status: Former Smoker     Packs/day: 0.50     Years: 8.00     Pack years: 4.00     Start date: 8/1/2008     Last attempt to quit: 1/3/2017     Years since quitting: 3.2     Smokeless tobacco: Never Used     Tobacco comment: Quit tobacco cigarettes 1/3/2017. Continue to smoke e-cigarettes.   Substance Use Topics     Alcohol use: Yes     Comment: daily 1-2 beers per day     Family History   Problem Relation Age of Onset     Diabetes Mother 65     Cerebrovascular Disease Mother 65     Myocardial Infarction Father 50        heart attack     Myocardial Infarction Maternal Grandfather 50           Reviewed and updated as needed this visit by Provider         Review of Systems   All other ROS negative.       Objective   Reported vitals:  There were no vitals taken for this visit.   healthy, alert and no distress  PSYCH: Alert and oriented times 3; coherent speech, normal   rate and volume, able to articulate logical thoughts, able   to abstract reason, no tangential thoughts, no hallucinations   or delusions  His affect is mildly anxious  RESP: No cough, no audible wheezing, able to talk in full sentences  Remainder of exam unable to be completed due to telephone visits    Diagnostic Test Results:  Labs reviewed in Epic        Assessment/Plan:  1. Anxiety  worse  - propranolol (INDERAL) 20 MG tablet; Take 1 tablet (20 mg) by mouth 2 times daily as needed  (anxiety/palpitations)  Dispense: 60 tablet; Refill: 1  Deep breathing/exercise and limit caffeine/vaping and ETOH. Consider SSRI. If suidical to er. Call/email with questions/concerns.   2. Palpitations  PVC's/anxiety/?   - propranolol (INDERAL) 20 MG tablet; Take 1 tablet (20 mg) by mouth 2 times daily as needed (anxiety/palpitations)  Dispense: 60 tablet; Refill: 1  - Echocardiogram Exercise Stress; Future  Will check stress echo with fhx. Limit caffeine/vaping/ETOH. To er if chest pain/ shortness of breath or prolonged palpitations with Lightheadness. Call/email with questions/concerns  3. FHx: coronary arteriosclerosis  dad  - Echocardiogram Exercise Stress; Future  Consider statin. Quit smoking and Recheck in 3 months for bp check/fasting labs. HOLD propranolol 24 hours before test.   No follow-ups on file.      Phone call duration:  17 minutes    Alan Steve MD

## 2020-04-23 ENCOUNTER — MYC MEDICAL ADVICE (OUTPATIENT)
Dept: FAMILY MEDICINE | Facility: CLINIC | Age: 50
End: 2020-04-23

## 2020-04-23 DIAGNOSIS — R00.2 PALPITATIONS: ICD-10-CM

## 2020-04-23 DIAGNOSIS — F41.9 ANXIETY: ICD-10-CM

## 2020-04-24 NOTE — TELEPHONE ENCOUNTER
Needs to hold propranolol for 24hours before stress test. I'm ok with 60mg propranolol BID AFTER stress test. Alan Steve MD

## 2020-04-28 ENCOUNTER — ANCILLARY PROCEDURE (OUTPATIENT)
Dept: CARDIOLOGY | Facility: CLINIC | Age: 50
End: 2020-04-28
Attending: FAMILY MEDICINE
Payer: COMMERCIAL

## 2020-04-28 DIAGNOSIS — R00.2 PALPITATIONS: ICD-10-CM

## 2020-04-28 DIAGNOSIS — Z82.49 FHX: CORONARY ARTERIOSCLEROSIS: ICD-10-CM

## 2020-04-28 PROCEDURE — 93321 DOPPLER ECHO F-UP/LMTD STD: CPT | Performed by: INTERNAL MEDICINE

## 2020-04-28 PROCEDURE — 93325 DOPPLER ECHO COLOR FLOW MAPG: CPT | Performed by: INTERNAL MEDICINE

## 2020-04-28 PROCEDURE — 93350 STRESS TTE ONLY: CPT | Performed by: INTERNAL MEDICINE

## 2020-04-28 PROCEDURE — 93352 ADMIN ECG CONTRAST AGENT: CPT | Performed by: INTERNAL MEDICINE

## 2020-04-28 PROCEDURE — 93017 CV STRESS TEST TRACING ONLY: CPT | Performed by: INTERNAL MEDICINE

## 2020-04-28 PROCEDURE — 93016 CV STRESS TEST SUPVJ ONLY: CPT | Performed by: INTERNAL MEDICINE

## 2020-04-28 PROCEDURE — 93018 CV STRESS TEST I&R ONLY: CPT | Performed by: INTERNAL MEDICINE

## 2020-04-28 RX ADMIN — Medication 5 ML: at 11:00

## 2020-05-04 RX ORDER — PROPRANOLOL HYDROCHLORIDE 40 MG/1
TABLET ORAL
Qty: 60 TABLET | Refills: 3 | Status: SHIPPED | OUTPATIENT
Start: 2020-05-04 | End: 2020-08-18

## 2020-05-04 NOTE — TELEPHONE ENCOUNTER
Previous prescription was for 20 mg bid.    To provider to sign for 40 mg bid dosing.  Paola GARICAN, RN

## 2020-07-01 DIAGNOSIS — R00.2 PALPITATIONS: ICD-10-CM

## 2020-07-01 DIAGNOSIS — F41.9 ANXIETY: ICD-10-CM

## 2020-07-03 RX ORDER — PROPRANOLOL HYDROCHLORIDE 40 MG/1
TABLET ORAL
Qty: 60 TABLET | Refills: 3 | OUTPATIENT
Start: 2020-07-03

## 2020-08-17 DIAGNOSIS — F41.9 ANXIETY: ICD-10-CM

## 2020-08-17 DIAGNOSIS — R00.2 PALPITATIONS: ICD-10-CM

## 2020-08-18 RX ORDER — PROPRANOLOL HYDROCHLORIDE 40 MG/1
TABLET ORAL
Qty: 60 TABLET | Refills: 3 | Status: SHIPPED | OUTPATIENT
Start: 2020-08-18 | End: 2021-08-11

## 2020-12-13 ENCOUNTER — HEALTH MAINTENANCE LETTER (OUTPATIENT)
Age: 50
End: 2020-12-13

## 2021-01-15 ENCOUNTER — HEALTH MAINTENANCE LETTER (OUTPATIENT)
Age: 51
End: 2021-01-15

## 2021-08-11 DIAGNOSIS — R00.2 PALPITATIONS: ICD-10-CM

## 2021-08-11 DIAGNOSIS — F41.9 ANXIETY: ICD-10-CM

## 2021-08-11 RX ORDER — PROPRANOLOL HYDROCHLORIDE 40 MG/1
TABLET ORAL
Qty: 180 TABLET | Refills: 0 | Status: SHIPPED | OUTPATIENT
Start: 2021-08-11 | End: 2021-11-09

## 2021-09-26 ENCOUNTER — HEALTH MAINTENANCE LETTER (OUTPATIENT)
Age: 51
End: 2021-09-26

## 2021-11-09 DIAGNOSIS — R00.2 PALPITATIONS: ICD-10-CM

## 2021-11-09 DIAGNOSIS — F41.9 ANXIETY: ICD-10-CM

## 2021-11-09 RX ORDER — PROPRANOLOL HYDROCHLORIDE 40 MG/1
TABLET ORAL
Qty: 180 TABLET | Refills: 0 | Status: SHIPPED | OUTPATIENT
Start: 2021-11-09 | End: 2022-02-03

## 2021-11-09 NOTE — LETTER
November 9, 2021    Yunior Goss  [address]  [place] [address]    Dear Yunior,       We recently received a refill request for propranolol (INDERAL) 40 MG tablet.  We have refilled this for a one time 90 day supply only because you are due for a:    Medication check office visit-appointment needed in the next 2 months per Dr Steve.      Please call at your earliest convenience so that there will not be a delay with your future refills.          Thank you,   Your [facility] Team/  [phone]

## 2022-01-16 ENCOUNTER — HEALTH MAINTENANCE LETTER (OUTPATIENT)
Age: 52
End: 2022-01-16

## 2022-02-03 DIAGNOSIS — R00.2 PALPITATIONS: ICD-10-CM

## 2022-02-03 DIAGNOSIS — F41.9 ANXIETY: ICD-10-CM

## 2022-02-03 RX ORDER — PROPRANOLOL HYDROCHLORIDE 40 MG/1
TABLET ORAL
Qty: 180 TABLET | Refills: 0 | Status: SHIPPED | OUTPATIENT
Start: 2022-02-03 | End: 2023-06-09

## 2022-02-03 NOTE — TELEPHONE ENCOUNTER
Routing refill request to provider for review/approval because:  Drug not on the [facility] refill protocol   Paola Jara BSN, RN

## 2022-06-24 DIAGNOSIS — F41.9 ANXIETY: ICD-10-CM

## 2022-06-24 DIAGNOSIS — R00.2 PALPITATIONS: ICD-10-CM

## 2022-06-26 RX ORDER — PROPRANOLOL HYDROCHLORIDE 40 MG/1
TABLET ORAL
Qty: 60 TABLET | Refills: 0 | OUTPATIENT
Start: 2022-06-26

## 2023-04-23 ENCOUNTER — HEALTH MAINTENANCE LETTER (OUTPATIENT)
Age: 53
End: 2023-04-23

## 2023-05-19 ENCOUNTER — MYC REFILL (OUTPATIENT)
Dept: FAMILY MEDICINE | Facility: CLINIC | Age: 53
End: 2023-05-19
Payer: COMMERCIAL

## 2023-05-19 DIAGNOSIS — R00.2 PALPITATIONS: ICD-10-CM

## 2023-05-19 DIAGNOSIS — F41.9 ANXIETY: ICD-10-CM

## 2023-05-22 RX ORDER — PROPRANOLOL HYDROCHLORIDE 40 MG/1
TABLET ORAL
Qty: 180 TABLET | Refills: 0 | OUTPATIENT
Start: 2023-05-22

## 2023-06-07 ENCOUNTER — MYC REFILL (OUTPATIENT)
Dept: FAMILY MEDICINE | Facility: CLINIC | Age: 53
End: 2023-06-07
Payer: COMMERCIAL

## 2023-06-07 DIAGNOSIS — F41.9 ANXIETY: ICD-10-CM

## 2023-06-07 DIAGNOSIS — R00.2 PALPITATIONS: ICD-10-CM

## 2023-06-07 RX ORDER — PROPRANOLOL HYDROCHLORIDE 40 MG/1
TABLET ORAL
Qty: 180 TABLET | Refills: 0 | OUTPATIENT
Start: 2023-06-07

## 2023-06-09 ENCOUNTER — MYC MEDICAL ADVICE (OUTPATIENT)
Dept: FAMILY MEDICINE | Facility: CLINIC | Age: 53
End: 2023-06-09
Payer: COMMERCIAL

## 2023-06-09 DIAGNOSIS — R00.2 PALPITATIONS: ICD-10-CM

## 2023-06-09 DIAGNOSIS — F41.9 ANXIETY: ICD-10-CM

## 2023-06-09 RX ORDER — PROPRANOLOL HYDROCHLORIDE 40 MG/1
TABLET ORAL
Qty: 60 TABLET | Refills: 1 | Status: SHIPPED | OUTPATIENT
Start: 2023-06-09 | End: 2023-07-20

## 2023-06-09 NOTE — TELEPHONE ENCOUNTER
Patient has appointment with Dr. Alan Steve 7/13  To provider to advise on refill until appointment        Christelle GARCIAN, RN

## 2023-07-20 DIAGNOSIS — F41.9 ANXIETY: ICD-10-CM

## 2023-07-20 DIAGNOSIS — R00.2 PALPITATIONS: ICD-10-CM

## 2023-07-20 RX ORDER — PROPRANOLOL HYDROCHLORIDE 40 MG/1
TABLET ORAL
Qty: 60 TABLET | Refills: 0 | Status: SHIPPED | OUTPATIENT
Start: 2023-07-20 | End: 2023-08-22

## 2023-08-22 DIAGNOSIS — F41.9 ANXIETY: ICD-10-CM

## 2023-08-22 DIAGNOSIS — R00.2 PALPITATIONS: ICD-10-CM

## 2023-08-22 NOTE — TELEPHONE ENCOUNTER
Medication Question or Refill        What medication are you calling about (include dose and sig)?:   propranolol (INDERAL) 40 MG tablet 60 tablet 0 7/20/2023  No   Sig: TAKE 1 TABLET (40 MG) BY MOUTH 2 TIMES DAILY AS NEEDED (ANXIETY/PALPITATIONS)       Preferred Pharmacy:   [id number] - [place], [place] - [address]  [address] [address]  Phone: [phone] Fax: [phone]      Controlled Substance Agreement on file:   CSA -- Patient Level:    CSA: None found at the patient level.       Who prescribed the medication?: Dr. Steve    Do you need a refill? Yes    When did you use the medication last? Daily    Patient offered an appointment? Yes: Patient scheduled with PCP on 9/26/23.  Was supposed to be seen today, 8/22/23, however, patient was running late and told by PCP to do an evisit.  Patient was unable to complete an evisit due to not being seen within three years.    Do you have any questions or concerns?  No      Could we send this information to you in [facility] or would you prefer to receive a phone call?:   Patient would prefer a phone call   Okay to leave a detailed message?: Yes at Cell number on file:    Telephone Information:   Mobile [phone]

## 2023-08-23 RX ORDER — PROPRANOLOL HYDROCHLORIDE 40 MG/1
TABLET ORAL
Qty: 60 TABLET | Refills: 0 | Status: SHIPPED | OUTPATIENT
Start: 2023-08-23 | End: 2024-03-19

## 2023-09-26 ENCOUNTER — OFFICE VISIT (OUTPATIENT)
Dept: FAMILY MEDICINE | Facility: CLINIC | Age: 53
End: 2023-09-26
Payer: COMMERCIAL

## 2023-09-26 ENCOUNTER — LAB (OUTPATIENT)
Dept: FAMILY MEDICINE | Facility: CLINIC | Age: 53
End: 2023-09-26

## 2023-09-26 VITALS
BODY MASS INDEX: 29 KG/M2 | TEMPERATURE: 99.4 F | WEIGHT: 184.8 LBS | OXYGEN SATURATION: 98 % | HEIGHT: 67 IN | HEART RATE: 70 BPM | DIASTOLIC BLOOD PRESSURE: 80 MMHG | SYSTOLIC BLOOD PRESSURE: 119 MMHG

## 2023-09-26 DIAGNOSIS — K64.4 EXTERNAL HEMORRHOIDS: ICD-10-CM

## 2023-09-26 DIAGNOSIS — Z13.6 CARDIOVASCULAR SCREENING; LDL GOAL LESS THAN 160: ICD-10-CM

## 2023-09-26 DIAGNOSIS — Z00.00 ROUTINE HISTORY AND PHYSICAL EXAMINATION OF ADULT: Primary | ICD-10-CM

## 2023-09-26 DIAGNOSIS — R00.2 PALPITATIONS: ICD-10-CM

## 2023-09-26 DIAGNOSIS — Z12.11 COLON CANCER SCREENING: ICD-10-CM

## 2023-09-26 DIAGNOSIS — Z12.5 SCREENING PSA (PROSTATE SPECIFIC ANTIGEN): ICD-10-CM

## 2023-09-26 DIAGNOSIS — F41.9 ANXIETY: ICD-10-CM

## 2023-09-26 PROCEDURE — 99214 OFFICE O/P EST MOD 30 MIN: CPT | Mod: 25 | Performed by: FAMILY MEDICINE

## 2023-09-26 PROCEDURE — 90715 TDAP VACCINE 7 YRS/> IM: CPT | Performed by: FAMILY MEDICINE

## 2023-09-26 PROCEDURE — 90471 IMMUNIZATION ADMIN: CPT | Performed by: FAMILY MEDICINE

## 2023-09-26 PROCEDURE — 99386 PREV VISIT NEW AGE 40-64: CPT | Mod: 25 | Performed by: FAMILY MEDICINE

## 2023-09-26 RX ORDER — PROPRANOLOL HCL 60 MG
60 CAPSULE, EXTENDED RELEASE 24HR ORAL DAILY
Qty: 90 CAPSULE | Refills: 3 | Status: SHIPPED | OUTPATIENT
Start: 2023-09-26 | End: 2024-09-30

## 2023-09-26 ASSESSMENT — ENCOUNTER SYMPTOMS
HEMATURIA: 0
EYE PAIN: 0
NAUSEA: 0
FEVER: 0
FREQUENCY: 0
HEMATOCHEZIA: 0
SORE THROAT: 0
DIARRHEA: 0
PARESTHESIAS: 0
HEADACHES: 0
JOINT SWELLING: 0
WEAKNESS: 0
CONSTIPATION: 0
NERVOUS/ANXIOUS: 0
COUGH: 0
CHILLS: 0
DYSURIA: 0
SHORTNESS OF BREATH: 0
PALPITATIONS: 0
MYALGIAS: 0
DIZZINESS: 0
ABDOMINAL PAIN: 0
ARTHRALGIAS: 0
HEARTBURN: 0

## 2023-09-26 ASSESSMENT — PAIN SCALES - GENERAL: PAINLEVEL: NO PAIN (0)

## 2023-09-26 NOTE — PROGRESS NOTES
SUBJECTIVE:   CC: Paco is an 53 year old who presents for preventative health visit.   History anxiety. Borderline lipids in past. Regular beer   Dad with MI at 51yo. X-smoker dad and htn. Patient with high triglycerides.  Mom with CVA - 66yo  Non-fasting.  3 siblings - doing ok. No mi/cva.   No family history colon cancer.   No chest pain or shortness of breath . Exercise -needs more.   No nausea, vomiting or diarrhea or black/bloody stools. No urine changes or hematuria. No std concens. No testicle pain/masses/hernia.   Eye exam a couple years ago.   Dentist in past year.   No major snoring or darrell noted.   No apnea. Home ok. 22,19,12. 3 yo granddaughter at home.  Work - programer. Emotionally doing ok.   Propranolol at bedtime. Occasionally skipped beats.   Caffeine - coffee - 3-4cups/day. Limited pop. Water intake.   ALCOHOL - 3/night beer. No rashes/mole changes.   Some milk.         2023    11:53 AM   Additional Questions   Roomed by STEFANIE Christianson CMA       Healthy Habits:     Getting at least 3 servings of Calcium per day:  NO    Bi-annual eye exam:  NO    Dental care twice a year:  NO    Sleep apnea or symptoms of sleep apnea:  Daytime drowsiness    Diet:  Regular (no restrictions)    Frequency of exercise:  None    Taking medications regularly:  Yes    Medication side effects:  None    Additional concerns today:  No          Social History     Tobacco Use    Smoking status: Former     Packs/day: 0.50     Years: 8.00     Pack years: 4.00     Types: Cigarettes     Start date: 2008     Quit date: 1/3/2017     Years since quittin.7    Smokeless tobacco: Never    Tobacco comments:     Quit tobacco cigarettes 1/3/2017. Continue to smoke e-cigarettes.   Substance Use Topics    Alcohol use: Yes     Comment: daily 1-2 beers per day             2023    11:51 AM   Alcohol Use   Prescreen: >3 drinks/day or >7 drinks/week? Yes   AUDIT SCORE  10       Last PSA:   PSA   Date Value Ref Range Status   2019  "0.83 0 - 4 ug/L Final     Comment:     Assay Method:  Chemiluminescence using Siemens Vista analyzer       Reviewed orders with patient. Reviewed health maintenance and updated orders accordingly - Yes  Lab work is in process    Reviewed and updated as needed this visit by clinical staff   Tobacco  Allergies  Meds              Reviewed and updated as needed this visit by Provider                   Review of Systems   Constitutional:  Negative for chills and fever.   HENT:  Negative for congestion, ear pain, hearing loss and sore throat.    Eyes:  Negative for pain and visual disturbance.   Respiratory:  Negative for cough and shortness of breath.    Cardiovascular:  Negative for chest pain, palpitations and peripheral edema.   Gastrointestinal:  Negative for abdominal pain, constipation, diarrhea, heartburn, hematochezia and nausea.   Genitourinary:  Negative for dysuria, frequency, genital sores, hematuria, impotence, penile discharge and urgency.   Musculoskeletal:  Negative for arthralgias, joint swelling and myalgias.   Skin:  Negative for rash.   Neurological:  Negative for dizziness, weakness, headaches and paresthesias.   Psychiatric/Behavioral:  Negative for mood changes. The patient is not nervous/anxious.        OBJECTIVE:   /80   Pulse 70   Temp 99.4  F (37.4  C) (Oral)   Ht 1.689 m (5' 6.5\")   Wt 83.8 kg (184 lb 12.8 oz)   SpO2 98%   BMI 29.38 kg/m      Physical Exam  GENERAL: healthy, alert and no distress  EYES: Eyes grossly normal to inspection, PERRL and conjunctivae and sclerae normal  HENT: ear canals and TM's normal, nose and mouth without ulcers or lesions  NECK: no adenopathy, no asymmetry, masses, or scars and thyroid normal to palpation  RESP: lungs clear to auscultation - no rales, rhonchi or wheezes  BREAST: normal without masses, tenderness or nipple discharge and no palpable axillary masses or adenopathy  CV: regular rate and rhythm, normal S1 S2, no S3 or S4, no murmur, " click or rub, no peripheral edema and peripheral pulses strong  ABDOMEN: soft, nontender, no hepatosplenomegaly, no masses and bowel sounds normal   (male): patient deferred /rectal exams  MS: no gross musculoskeletal defects noted, no edema  SKIN: no suspicious lesions or rashes  NEURO: Normal strength and tone, mentation intact and speech normal  PSYCH: mentation appears normal, affect normal/bright  PSYCH: mildly anxious  LYMPH: no cervical, supraclavicular, axillary, or inguinal adenopathy    ASSESSMENT/PLAN:   ASSESSMENT / PLAN:  (Z00.00) Routine history and physical examination of adult  (primary encounter diagnosis)  Comment: generally healthy and normal exam  Plan: CBC with platelets, Renal panel        Future labs. Reviewed self mole/testicle check handout.  vitaminD. Follow-up eye exam. Increase exercise. Limit beer/caffeine.     (K64.4) External hemorrhoids  Comment: per history - mentioned at end of visit and chronic issue  Plan: Adult Colorectal Surgery  Referral        Limit caffeine and stooling time. Increase fiber/water. Patient would like follow-up with specialist. Expected course and warning signs reviewed. Call/email with questions/concerns     (R00.2) Palpitations  Comment: stable  Plan: propranolol ER (INDERAL LA) 60 MG 24 hr capsule        Limit nicotine/caffeine. Expected course and warning signs reviewed. Will make propranolol from short acting to LA. Chest pain or shortness of breath or prolonged palpitations/ LIGHTHEADED to er.     (F41.9) Anxiety  Comment: stable  Plan: propranolol ER (INDERAL LA) 60 MG 24 hr capsule        If SUICIAL IDEATION OR HOMOCIDAL IDEATION OR ANN TO ER. Exercise and limit nicotine/caffeine.     (Z12.11) Colon cancer screening    Plan: GURVINDER([employer])        Patient might do colonoscopy in future. Willing to do colo-guard    (Z12.5) Screening PSA (prostate specific antigen)  C  Plan: Prostate Specific Antigen Screen          (Z13.6)  "CARDIOVASCULAR SCREENING; LDL GOAL LESS THAN 160    Plan: Lipid Profile        Future fasting. Exrecise and weight loss. Lower carb diet.       Patient has been advised of split billing requirements and indicates understanding: Yes      COUNSELING:   Reviewed preventive health counseling, as reflected in patient instructions       Regular exercise       Healthy diet/nutrition       Vision screening       Alcohol Use        Colorectal cancer screening       Prostate cancer screening       Osteoporosis prevention/bone health      BMI:   Estimated body mass index is 29.38 kg/m  as calculated from the following:    Height as of this encounter: 1.689 m (5' 6.5\").    Weight as of this encounter: 83.8 kg (184 lb 12.8 oz).   Weight management plan: Discussed healthy diet and exercise guidelines      He reports that he quit smoking about 6 years ago. His smoking use included cigarettes. He started smoking about 15 years ago. He has a 4.00 pack-year smoking history. He has never used smokeless tobacco.            Alan Steve MD  [facility]  "

## 2023-09-26 NOTE — NURSING NOTE
Prior to immunization administration, verified patients identity using patient s name and date of birth. Please see Immunization Activity for additional information.     Screening Questionnaire for Adult Immunization    Are you sick today?   No   Do you have allergies to medications, food, a vaccine component or latex?   No   Have you ever had a serious reaction after receiving a vaccination?   No   Do you have a long-term health problem with heart, lung, kidney, or metabolic disease (e.g., diabetes), asthma, a blood disorder, no spleen, complement component deficiency, a cochlear implant, or a spinal fluid leak?  Are you on long-term aspirin therapy?   No   Do you have cancer, leukemia, HIV/AIDS, or any other immune system problem?   No   Do you have a parent, brother, or sister with an immune system problem?   No   In the past 3 months, have you taken medications that affect  your immune system, such as prednisone, other steroids, or anticancer drugs; drugs for the treatment of rheumatoid arthritis, Crohn s disease, or psoriasis; or have you had radiation treatments?   No   Have you had a seizure, or a brain or other nervous system problem?   No   During the past year, have you received a transfusion of blood or blood    products, or been given immune (gamma) globulin or antiviral drug?   No   For women: Are you pregnant or is there a chance you could become       pregnant during the next month?   No   Have you received any vaccinations in the past 4 weeks?   No     Immunization questionnaire answers were all negative.      Patient instructed to remain in clinic for 15 minutes afterwards, and to report any adverse reactions.     Screening performed by Lynn Christianson on 9/26/2023 at 12:34 PM.

## 2023-10-06 ENCOUNTER — DOCUMENTATION ONLY (OUTPATIENT)
Dept: FAMILY MEDICINE | Facility: CLINIC | Age: 53
End: 2023-10-06

## 2023-10-06 ENCOUNTER — LAB (OUTPATIENT)
Dept: LAB | Facility: CLINIC | Age: 53
End: 2023-10-06
Payer: COMMERCIAL

## 2023-10-06 DIAGNOSIS — Z13.6 CARDIOVASCULAR SCREENING; LDL GOAL LESS THAN 160: ICD-10-CM

## 2023-10-06 DIAGNOSIS — Z00.00 ROUTINE HISTORY AND PHYSICAL EXAMINATION OF ADULT: ICD-10-CM

## 2023-10-06 DIAGNOSIS — Z12.5 SCREENING PSA (PROSTATE SPECIFIC ANTIGEN): ICD-10-CM

## 2023-10-06 DIAGNOSIS — Z00.00 PREVENTATIVE HEALTH CARE: Primary | ICD-10-CM

## 2023-10-06 DIAGNOSIS — Z00.00 PREVENTATIVE HEALTH CARE: ICD-10-CM

## 2023-10-06 LAB
ERYTHROCYTE [DISTWIDTH] IN BLOOD BY AUTOMATED COUNT: 12.2 % (ref 10–15)
HCT VFR BLD AUTO: 46.5 % (ref 40–53)
HGB BLD-MCNC: 16.2 G/DL (ref 13.3–17.7)
MCH RBC QN AUTO: 30.9 PG (ref 26.5–33)
MCHC RBC AUTO-ENTMCNC: 34.8 G/DL (ref 31.5–36.5)
MCV RBC AUTO: 89 FL (ref 78–100)
PLATELET # BLD AUTO: 248 10E3/UL (ref 150–450)
RBC # BLD AUTO: 5.25 10E6/UL (ref 4.4–5.9)
WBC # BLD AUTO: 7 10E3/UL (ref 4–11)

## 2023-10-06 PROCEDURE — 82248 BILIRUBIN DIRECT: CPT

## 2023-10-06 PROCEDURE — 80061 LIPID PANEL: CPT

## 2023-10-06 PROCEDURE — 85027 COMPLETE CBC AUTOMATED: CPT

## 2023-10-06 PROCEDURE — 80053 COMPREHEN METABOLIC PANEL: CPT

## 2023-10-06 PROCEDURE — 36415 COLL VENOUS BLD VENIPUNCTURE: CPT

## 2023-10-06 PROCEDURE — 84100 ASSAY OF PHOSPHORUS: CPT

## 2023-10-06 PROCEDURE — G0103 PSA SCREENING: HCPCS

## 2023-10-06 NOTE — PROGRESS NOTES
Paco New just got his previsit labs done from you. Even though he is getting his Renal panel checked, he is wondering if he can get the whole CMP checked too. Blood is in lab. If it is okay, will you please place an add on order? Thank you    Kong CHU

## 2023-10-07 LAB
ALBUMIN SERPL BCG-MCNC: 4.4 G/DL (ref 3.5–5.2)
ALBUMIN SERPL BCG-MCNC: 4.4 G/DL (ref 3.5–5.2)
ALP SERPL-CCNC: 57 U/L (ref 40–129)
ALT SERPL W P-5'-P-CCNC: 27 U/L (ref 0–70)
ANION GAP SERPL CALCULATED.3IONS-SCNC: 11 MMOL/L (ref 7–15)
AST SERPL W P-5'-P-CCNC: 27 U/L (ref 0–45)
BILIRUB DIRECT SERPL-MCNC: <0.2 MG/DL (ref 0–0.3)
BILIRUB SERPL-MCNC: 0.6 MG/DL
BUN SERPL-MCNC: 14.6 MG/DL (ref 6–20)
CALCIUM SERPL-MCNC: 10 MG/DL (ref 8.6–10)
CHLORIDE SERPL-SCNC: 100 MMOL/L (ref 98–107)
CHOLEST SERPL-MCNC: 244 MG/DL
CREAT SERPL-MCNC: 1.41 MG/DL (ref 0.67–1.17)
DEPRECATED HCO3 PLAS-SCNC: 27 MMOL/L (ref 22–29)
EGFRCR SERPLBLD CKD-EPI 2021: 60 ML/MIN/1.73M2
GLUCOSE SERPL-MCNC: 94 MG/DL (ref 70–99)
HDLC SERPL-MCNC: 45 MG/DL
LDLC SERPL CALC-MCNC: 151 MG/DL
NONHDLC SERPL-MCNC: 199 MG/DL
PHOSPHATE SERPL-MCNC: 4.6 MG/DL (ref 2.5–4.5)
POTASSIUM SERPL-SCNC: 4.8 MMOL/L (ref 3.4–5.3)
PROT SERPL-MCNC: 7.6 G/DL (ref 6.4–8.3)
PSA SERPL DL<=0.01 NG/ML-MCNC: 0.71 NG/ML (ref 0–3.5)
SODIUM SERPL-SCNC: 138 MMOL/L (ref 135–145)
TRIGL SERPL-MCNC: 238 MG/DL

## 2023-10-12 ENCOUNTER — MYC MEDICAL ADVICE (OUTPATIENT)
Dept: FAMILY MEDICINE | Facility: CLINIC | Age: 53
End: 2023-10-12
Payer: COMMERCIAL

## 2024-03-01 ENCOUNTER — TELEPHONE (OUTPATIENT)
Dept: FAMILY MEDICINE | Facility: CLINIC | Age: 54
End: 2024-03-01
Payer: COMMERCIAL

## 2024-03-01 NOTE — TELEPHONE ENCOUNTER
Patient Quality Outreach    Patient is due for the following:   Colon Cancer Screening    Next Steps:   Patient completes cologuard    Type of outreach:    Sent [email or web address] message.      Questions for provider review:    None           Lynn Christianson

## 2024-03-19 DIAGNOSIS — F41.9 ANXIETY: ICD-10-CM

## 2024-03-19 DIAGNOSIS — R00.2 PALPITATIONS: ICD-10-CM

## 2024-03-19 RX ORDER — PROPRANOLOL HYDROCHLORIDE 40 MG/1
TABLET ORAL
Qty: 60 TABLET | Refills: 0 | Status: SHIPPED | OUTPATIENT
Start: 2024-03-19 | End: 2024-03-22

## 2024-03-22 DIAGNOSIS — R00.2 PALPITATIONS: ICD-10-CM

## 2024-03-22 DIAGNOSIS — F41.9 ANXIETY: ICD-10-CM

## 2024-03-22 RX ORDER — PROPRANOLOL HYDROCHLORIDE 40 MG/1
TABLET ORAL
Qty: 180 TABLET | Refills: 1 | Status: SHIPPED | OUTPATIENT
Start: 2024-03-22

## 2024-06-03 ENCOUNTER — TELEPHONE (OUTPATIENT)
Dept: FAMILY MEDICINE | Facility: CLINIC | Age: 54
End: 2024-06-03
Payer: COMMERCIAL

## 2024-06-03 NOTE — TELEPHONE ENCOUNTER
Patient Quality Outreach    Patient is due for the following:   Colon Cancer Screening      Topic Date Due    Hepatitis B Vaccine (1 of 3 - 19+ 3-dose series) Never done    Zoster (Shingles) Vaccine (1 of 2) Never done    COVID-19 Vaccine (3 - 2023-24 season) 09/01/2023       Next Steps:   Colon cancer screening    Type of outreach:    Sent "[employer] message.      Questions for provider review:    None           Lynn Christianson

## 2024-08-27 ENCOUNTER — PATIENT OUTREACH (OUTPATIENT)
Dept: CARE COORDINATION | Facility: CLINIC | Age: 54
End: 2024-08-27
Payer: COMMERCIAL

## 2024-08-27 ENCOUNTER — PATIENT OUTREACH (OUTPATIENT)
Dept: FAMILY MEDICINE | Facility: CLINIC | Age: 54
End: 2024-08-27
Payer: COMMERCIAL

## 2024-08-27 NOTE — TELEPHONE ENCOUNTER
Patient Quality Outreach    Patient is due for the following:   Colon Cancer Screening    Next Steps:   Please complete and return the cologuard test    Type of outreach:    Sent [employer] message.    Next Steps:  Reach out within 90 days via [employer].    Max number of attempts reached: Yes. Will try again in 90 days if patient still on fail list.    Questions for provider review:    None           Sabra Davila, CMA

## 2024-09-10 ENCOUNTER — PATIENT OUTREACH (OUTPATIENT)
Dept: CARE COORDINATION | Facility: CLINIC | Age: 54
End: 2024-09-10
Payer: COMMERCIAL

## 2024-09-28 DIAGNOSIS — F41.9 ANXIETY: ICD-10-CM

## 2024-09-28 DIAGNOSIS — R00.2 PALPITATIONS: ICD-10-CM

## 2024-09-28 NOTE — LETTER
September 30, 2024    Yunior Goss  [address]  [place] [address]    Dear Yunior,       We recently received a refill request for propranolol ER (INDERAL LA) 60 MG 24 hr capsule .  We have refilled this for a one time 90 day supply only because you are due for a:    Medication check office visit and fasting lab appointment      Please schedule an office visit with your provider and a lab appointment 4-5 days prior to the office visit.     Please call at your earliest convenience so that there will not be a delay with your future refills.          Thank you,   Your [facility] Team/  [phone]

## 2024-09-30 RX ORDER — PROPRANOLOL HCL 60 MG
60 CAPSULE, EXTENDED RELEASE 24HR ORAL DAILY
Qty: 90 CAPSULE | Refills: 0 | Status: SHIPPED | OUTPATIENT
Start: 2024-09-30

## 2024-11-17 ENCOUNTER — HEALTH MAINTENANCE LETTER (OUTPATIENT)
Age: 54
End: 2024-11-17

## 2025-01-21 DIAGNOSIS — F41.9 ANXIETY: ICD-10-CM

## 2025-01-21 DIAGNOSIS — R00.2 PALPITATIONS: ICD-10-CM

## 2025-01-22 RX ORDER — PROPRANOLOL HYDROCHLORIDE 60 MG/1
60 CAPSULE, EXTENDED RELEASE ORAL DAILY
Qty: 90 CAPSULE | Refills: 0 | OUTPATIENT
Start: 2025-01-22